# Patient Record
Sex: FEMALE | Race: WHITE
[De-identification: names, ages, dates, MRNs, and addresses within clinical notes are randomized per-mention and may not be internally consistent; named-entity substitution may affect disease eponyms.]

---

## 2017-01-01 ENCOUNTER — HOSPITAL ENCOUNTER (EMERGENCY)
Dept: HOSPITAL 53 - M ED | Age: 27
Discharge: HOME | End: 2017-01-01
Payer: COMMERCIAL

## 2017-01-01 DIAGNOSIS — F43.10: ICD-10-CM

## 2017-01-01 DIAGNOSIS — Z72.0: ICD-10-CM

## 2017-01-01 DIAGNOSIS — Z79.899: ICD-10-CM

## 2017-01-01 DIAGNOSIS — N21.1: Primary | ICD-10-CM

## 2017-01-01 DIAGNOSIS — E07.9: ICD-10-CM

## 2017-01-01 LAB
ALBUMIN SERPL BCG-MCNC: 4.5 GM/DL (ref 3.2–5.2)
ALBUMIN/GLOB SERPL: 1.29 {RATIO} (ref 1–1.93)
ALP SERPL-CCNC: 74 U/L (ref 45–117)
ALT SERPL W P-5'-P-CCNC: 20 U/L (ref 12–78)
ANION GAP SERPL CALC-SCNC: 7 MEQ/L (ref 8–16)
AST SERPL-CCNC: 9 U/L (ref 15–37)
BASOPHILS # BLD AUTO: 0 K/MM3 (ref 0–0.2)
BASOPHILS NFR BLD AUTO: 0.4 % (ref 0–1)
BILIRUB CONJ SERPL-MCNC: 0.1 MG/DL (ref 0–0.2)
BILIRUB SERPL-MCNC: 0.5 MG/DL (ref 0.2–1)
BUN SERPL-MCNC: 19 MG/DL (ref 7–18)
CALCIUM SERPL-MCNC: 9 MG/DL (ref 8.5–10.1)
CAOX CRY URNS QL MICRO: (no result)
CHLORIDE SERPL-SCNC: 106 MEQ/L (ref 98–107)
CO2 SERPL-SCNC: 29 MEQ/L (ref 21–32)
CREAT SERPL-MCNC: 0.91 MG/DL (ref 0.55–1.02)
EOSINOPHIL # BLD AUTO: 0.3 K/MM3 (ref 0–0.5)
EOSINOPHIL NFR BLD AUTO: 2.7 % (ref 0–3)
ERYTHROCYTE [DISTWIDTH] IN BLOOD BY AUTOMATED COUNT: 12.9 % (ref 11.5–14.5)
GFR SERPL CREATININE-BSD FRML MDRD: > 60 ML/MIN/{1.73_M2} (ref 60–?)
GLUCOSE SERPL-MCNC: 93 MG/DL (ref 70–105)
LARGE UNSTAINED CELL #: 0.1 K/MM3 (ref 0–0.4)
LARGE UNSTAINED CELL %: 0.9 % (ref 0–4)
LYMPHOCYTES # BLD AUTO: 1 K/MM3 (ref 1.5–6.5)
LYMPHOCYTES NFR BLD AUTO: 9.7 % (ref 24–44)
MCH RBC QN AUTO: 29 PG (ref 27–33)
MCHC RBC AUTO-ENTMCNC: 33.1 G/DL (ref 32–36.5)
MCV RBC AUTO: 87.6 FL (ref 80–96)
MONOCYTES # BLD AUTO: 0.4 K/MM3 (ref 0–0.8)
MONOCYTES NFR BLD AUTO: 3.7 % (ref 0–5)
NEUTROPHILS # BLD AUTO: 8.5 K/MM3 (ref 1.8–7.7)
NEUTROPHILS NFR BLD AUTO: 82.6 % (ref 36–66)
PLATELET # BLD AUTO: 244 K/MM3 (ref 150–450)
POTASSIUM SERPL-SCNC: 3.7 MEQ/L (ref 3.5–5.1)
PROT SERPL-MCNC: 8 GM/DL (ref 6.4–8.2)
SODIUM SERPL-SCNC: 142 MEQ/L (ref 136–145)
WBC # BLD AUTO: 10.3 K/MM3 (ref 4–10)

## 2017-01-01 NOTE — EDDOCDS
Physician Documentation                                                                           

[facility]                                                                         

Name: Idania Ponce                                                                                

Age: 26 yrs                                                                                       

Sex: Female                                                                                       

: 1990                                                                                   

MRN: [id number]                                                                                     

Arrival Date: 2017                                                                          

Time: 10:05                                                                                       

Account#: [id number]                                                                              

Bed I2 / M2                                                                                       

Private MD:                                                                                       

Disposition:                                                                                      

17 12:49 Discharged to Home/Self Care. Impression: Unspecified renal colic.                 

- Condition is Stable.                                                                            

- Discharge Instructions: Ureteral Colic.                                                         

- Prescriptions for Ultram 50 mg Oral Tablet - take 1 tablet by ORAL route every 6                

hours As needed MDD: 4 tabs; 12 tablet.                                                         

- Medication Reconciliation, Local Pharmacy Hours form.                                           

- Follow up: Emergency Department; When: As needed; Reason: Worsening of conditions.              

Follow up: Private Physician; When: Call to arrange an appointment; Reason:                     

Wound/Symptom Recheck, Recheck today's complaints, Continuance of care.                         

- Problem is an ongoing problem.                                                                  

- Symptoms are resolved.                                                                          

                                                                                                  

                                                                                                  

                                                                                                  

Historical:                                                                                       

- Allergies: no known allergies;                                                                  

- Home Meds:                                                                                      

1. Effexor XR 225mg Oral 1 cap once daily                                                       

     (Last dose: 2016 07:00)                                                                

2. levothyroxine 88 mcg Oral cap 1 cap once daily                                               

     (Last dose: 2017 07:00)                                                                

3. Motrin 200mg Oral tab 4 tab as needed                                                        

     (Last dose: 2017 08:00)                                                                

- PMHx: PTSD; Thyroid problem;                                                                    

- PSHx: chest tube after birth; Right breast reconstruction;                                      

- Social history: Smoking status: Patient uses tobacco products, light tobacco smoker.            

No barriers to communication noted, The patient speaks fluent English.                          

- Family history: Not pertinent.                                                                  

- : The pt / caregiver states he / she is not on anticoagulants. Home medication list             

is obtained from the patient.                                                                   

- Exposure Risk Screening:: None identified.                                                      

                                                                                                  

                                                                                                  

OB/GYN:                                                                                           

                                                                                             

10:16 LMP N/A - Irregular menses                                                              po  

                                                                                                  

Vital Signs:                                                                                      

10:07  / 98; Pulse 88; Resp 18; Temp 98.1; Pulse Ox 100% ; Weight 64.86 kg / 142.99     sar1

      lbs; Height 5 ft. 4 in. (162.56 cm); Pain 9/10;                                             

11:42 Pain 4/10;                                                                              jc4 

12:49  / 78; Pulse 87; Resp 20; Temp 98.2(O); Pulse Ox 100% ; Pain 0/10;                jml1

10:07 Body Mass Index 24.55 (64.86 kg, 162.56 cm)                                             sar1

                                                                                                  

MDM:                                                                                              

10:53 NS 0.9% 1000 ml IV at bolus once ordered.                                               cc10

10:53 Ondansetron 4 mg IVP once ordered.                                                      cc10

10:53 ketorolac 30 mg IVP once ordered.                                                       cc10

10:53 IV Saline Lock ordered.                                                                 cc10

10:53 Undress patient appropriately for examination ordered.                                  cc10

10:54 Basic Metabolic Profile Ordered.                                                        EDMS

10:54 CBC with Diff Ordered.                                                                  EDMS

10:54 Lipase Ordered.                                                                         EDMS

10:54 Liver Profile Ordered.                                                                  EDMS

10:54 Urinalysis Ordered.                                                                     EDMS

10:54 Urine Culture Ordered.                                                                  EDMS

10:54 UCG by Nursing ordered.                                                                 cc10

10:54 NOTHING BY MOUTH+DIET ordered.                                                          EDMS

11:07 [facility] Payment Agreement was scanned into [employer] and attached to record.               jp5 

11:08 Financial registration complete.                                                        jp5 

11:54 Basic Metabolic Profile Reviewed.                                                       cc10

11:54 CBC with Diff Reviewed.                                                                 cc10

11:54 Liver Profile Reviewed.                                                                 cc10

11:54 Urinalysis Reviewed.                                                                    cc10

11:54 Lipase Reviewed.                                                                        cc10

11:58 morphine 4 mg IVP once ordered.                                                         cc10

11:59 CT ABD & PELVIS: No Contrast Ordered.                                                   EDMS

                                                                                                  

Point of Care Testing:                                                                            

      Urine Pregnancy:                                                                            

11:42 hCG Reading: Negative; Control Reading: Positive;                                       jc4 

      Ranges:                                                                                     

                                                                                                  

Administered Medications:                                                                         

11:04 Drug: NS 0.9% 1000 ml [sodium chloride 0.9 % intravenous solution] Route: IV; Rate:     hs1 

      bolus; Site: right antecubital;                                                             

12:56 Follow up: IV Status: Completed infusion; IV Intake: 1000ml                             hs1 

11:04 Drug: Ondansetron 4 mg [ondansetron HCl 2 mg/mL intravenous solution (2 mL)] Route:     hs1 

      IVP; Site: right antecubital;                                                               

11:05 Drug: ketorolac 30 mg [ketorolac 30 mg/mL (1 mL) injection solution (1 mL)] Route: IVP; hs1 

      Site: right antecubital;                                                                    

11:42 Follow up: Pain 4/10 Adult                                                              4 

12:06 Drug: morphine 4 mg [morphine 4 mg/mL intravenous cartridge (1 mL)] Route: IVP; Site:   [facility] 

      right antecubital;                                                                          

12:06 Follow up: Response: Confirmed pt not driving.                                          jc4 

                                                                                                  

                                                                                                  

Signatures:                                                                                       

Dispatcher [employer]                           Yovani Vega,RN                          RN   Renetta Hager RN                    RN   hs1                                                  

Melanie Harden RN                    RN   jc4                                                  

Tuan Matias, PAAlbinoC                    PA-C cc10                                                 

Montserrat Quezada                              jp5                                                  

                                                                                                  

The chart was reviewed and I authenticate all verbal orders and agree with the evaluation and 
treatment provided.Attachments:

11:07 [facility] Payment Agreement                                                                jp5 

                                                                                                  

**************************************************************************************************

MTDD

## 2017-01-01 NOTE — EDDOCDS
Nurse's Notes                                                                                     

[facility]                                                                         

Name: Idania Ponce                                                                                

Age: 26 yrs                                                                                       

Sex: Female                                                                                       

: 1990                                                                                   

MRN: [id number]                                                                                     

Arrival Date: 2017                                                                          

Time: 10:05                                                                                       

Account#: [id number]                                                                              

Bed I2 / M2                                                                                       

Private MD:                                                                                       

Diagnosis: Unspecified renal colic                                                                

                                                                                                  

Presentation:                                                                                     

                                                                                             

10:11 Presenting complaint: Patient states: Friday had pain in RLQ with N/V that resolved.    po  

      Awoke with right flank pain this am. Acute neurological deficits are not present.           

      Mechanism of Injury: No Mechanism of Injury. Adult Sepsis Screening: The patient does       

      not have new or worsening altered mentation. Patient's respiratory rate is less than        

      22. Systolic blood pressure is greater than 100. Patient has a qSOFA score of 0-            

      Negative Sepsis Screen. Suicide/Homicide risk assessment- the patient denies having any     

      suicidal and/or homicidal ideations and does not present with any other emotional,          

      behavioral or mental health complaints.  Status: Patient is not a service           

      member or  dependent. Transition of care: patient was not received from another     

      setting of care.                                                                            

10:11 Acuity: RADHA Level 3                                                                     po  

10:11 Method Of Arrival: Walkin/Carried/Asstd                                                 po  

                                                                                                  

Triage Assessment:                                                                                

10:16 General: Appears in no apparent distress, Behavior is appropriate for age, cooperative. po  

      Pain: Location: right flank Pain currently is 9 out of 10 on a pain scale. Quality of       

      pain is described as aching, dull, Is intermittent. HIV screening NA for this visit         

      Offered previously. Neurological: Level of Consciousness is awake, alert, Oriented to       

      person, place, time. GI: Reports nausea. : Denies burning with urination, urinary         

      frequency, urgency. Musculoskeletal: Denies weakness, numbness.                             

                                                                                                  

OB/GYN:                                                                                           

10:16 LMP N/A - Irregular menses                                                              po  

                                                                                                  

Historical:                                                                                       

- Allergies: no known allergies;                                                                  

- Home Meds:                                                                                      

1. Effexor XR 225mg Oral 1 cap once daily                                                       

     (Last dose: 2016 07:00)                                                                

2. levothyroxine 88 mcg Oral cap 1 cap once daily                                               

     (Last dose: 2017 07:00)                                                                

3. Motrin 200mg Oral tab 4 tab as needed                                                        

     (Last dose: 2017 08:00)                                                                

- PMHx: PTSD; Thyroid problem;                                                                    

- PSHx: chest tube after birth; Right breast reconstruction;                                      

- Social history: Smoking status: Patient uses tobacco products, light tobacco smoker.            

No barriers to communication noted, The patient speaks fluent English.                          

- Family history: Not pertinent.                                                                  

- : The pt / caregiver states he / she is not on anticoagulants. Home medication list             

is obtained from the patient.                                                                   

- Exposure Risk Screening:: None identified.                                                      

                                                                                                  

                                                                                                  

Screenin:09 Screening information is obtained from the patient. Fall risk: No risks identified.     jc4 

      Assistance ADL's: requires no assistance with activities of daily living. Abuse/DV          

      Screen: The patient / caregiver reports he/she is: not in a situation that causes fear,     

      pain or injury. Nutritional screening: No deficits noted. Advance Directives:               

      Currently, there is no health care proxy. There is no active DNR order. There is no         

      living will. There is no Power of . home support is adequate.                       

                                                                                                  

Assessment:                                                                                       

11:08 General: Appears in no apparent distress, Behavior is cooperative, pleasant. Pain: Pain jc4 

      currently is 7 out of 10 on a pain scale. Neurological: Level of Consciousness is           

      awake, alert, Oriented to person, place, time. Respiratory: Airway is patent                

      Respiratory effort is even, unlabored, Respiratory pattern is regular, symmetrical. :     

      Reports pain in right flank(s). Derm: Skin is pink, warm & dry.                           

11:44 General: Pt resting on stretcher. No distress noted at this time. Pt reports that pain  jc4 

      is currently "4/10" at this time. Color pink, skin warm and dry. Respirations easy and      

      full. Significant other at bedside. Call bell in reach.                                     

12:54 General: Appears in no apparent distress, comfortable, Behavior is appropriate for age, hs1 

      cooperative. Pain: Denies pain. Cardiovascular: No deficits noted. Respiratory: Airway      

      is patent Respiratory effort is even, unlabored, Respiratory pattern is regular,            

      symmetrical. GI: Denies nausea. : Denies. Derm: Skin is pink, warm & dry. normal.       

                                                                                                  

Vital Signs:                                                                                      

10:07  / 98; Pulse 88; Resp 18; Temp 98.1; Pulse Ox 100% ; Weight 64.86 kg; Height 5    sar1

      ft. 4 in. (162.56 cm); Pain 9/10;                                                           

11:42 Pain 4/10;                                                                              jc4 

12:49  / 78; Pulse 87; Resp 20; Temp 98.2(O); Pulse Ox 100% ; Pain 0/10;                jml1

10:07 Body Mass Index 24.55 (64.86 kg, 162.56 cm)                                             [facility]

                                                                                                  

Vitals:                                                                                           

10:07 Log In Time: 2017 at 10:07.                                                 [facility]

                                                                                                  

ED Course:                                                                                        

10:06 Patient visited by Kaya Min, Unit Clerk.                                      sar1

10:06 Patient moved to Waiting                                                                sar1

10:08 Patient moved to Pre RCE                                                                sar1

10:13 Triage Initiated                                                                        po  

10:16 Arm band placed on right wrist. Patient placed in waiting room. Family accompanied      po  

      patient.                                                                                    

10:18 Patient visited by Yovani Shah RN.                                                      po  

10:32 Patient moved to Triage 3                                                               mlb1

10:42 Tuan Matias PA-C is PHCP.                                                           cc10

10:42 Yue Rhoades MD is Attending Physician.                                      cc10

10:42 Patient visited by Tuan Matias PA-C.                                                cc10

10:42 Patient visited by Tuan Matias PA-C.                                                cc10

10:54 Melanie Harden, SHANE is Primary Nurse.                                                  rn1 

10:54 Patient moved to I2 / M2                                                                rn1 

11:01 Inserted saline lock: 20 gauge in right antecubital area The patient tolerated the      jc4 

      procedure well.                                                                             

11:04 Basic Metabolic Profile Sent.                                                           hs1 

11:04 CBC with Diff Sent.                                                                     hs1 

11:04 Lipase Sent.                                                                            hs1 

11:04 Liver Profile Sent.                                                                     hs1 

11:07 [facility] Payment Agreement was scanned into [employer] and attached to record.               jp5 

11:08 The patient / caregiver is instructed regarding the plan of care and ED course.         jc4 

11:13 Patient visited by Renetta Montero RN.                                                hs1 

11:42 Urinalysis Sent.                                                                        jc4 

11:42 Urine Culture Sent.                                                                     jc4 

11:44 Patient visited by Melanie Harden RN.                                                jc4 

12:43 CT ABD & PELVIS: No Contrast Returned.                                                  EDMS

12:45 Patient visited by Renetta Montero RN.                                                hs1 

12:49 Patient visited by Michael Wagner.                                                       jml1

12:55 Discontinued IV lock intact, bleeding controlled, pressure dressing applied, No         hs1 

      redness/swelling at site. No procedures done that require assistance.                       

                                                                                                  

Administered Medications:                                                                         

11:04 Drug: NS 0.9% 1000 ml [sodium chloride 0.9 % intravenous solution] Route: IV; Rate:     hs1 

      bolus; Site: right antecubital;                                                             

12:56 Follow up: IV Status: Completed infusion; IV Intake: 1000ml                             hs1 

11:04 Drug: Ondansetron 4 mg [ondansetron HCl 2 mg/mL intravenous solution (2 mL)] Route:     hs1 

      IVP; Site: right antecubital;                                                               

11:05 Drug: ketorolac 30 mg [ketorolac 30 mg/mL (1 mL) injection solution (1 mL)] Route: IVP; hs1 

      Site: right antecubital;                                                                    

11:42 Follow up: Pain 4/10 Adult                                                              jc4 

12:06 Drug: morphine 4 mg [morphine 4 mg/mL intravenous cartridge (1 mL)] Route: IVP; Site:   jc4 

      right antecubital;                                                                          

12:06 Follow up: Response: Confirmed pt not driving.                                          jc4 

                                                                                                  

                                                                                                  

Point of Care Testing:                                                                            

      Urine Pregnancy:                                                                            

11:42 hCG Reading: Negative; Control Reading: Positive;                                       jc4 

      Ranges:                                                                                     

                                                                                                  

Intake:                                                                                           

12:56 IV: 1000.00ml; Total: 1000.00ml.                                                        hs1 

                                                                                                  

Order Results:                                                                                    

Lab Order: Basic Metabolic Profile; SPEC'M 17 11:02                                         

      Test: GLUCOSE, FASTING; Value: 93; Range: ; Units: MG/DL; Status: F                   

      Test: BLOOD UREA NITROGEN; Value: 19; Range: 7-18; Abnormal: Above high normal; Units:      

      MG/DL; Status: F                                                                            

      Test: CREATININE FOR GFR; Value: 0.91; Range: 0.55-1.02; Units: MG/DL; Status: F            

      Test: GLOMERULAR FILTRATION RATE; Value: > 60.0; Range: >60; Status: F                      

      Test: SODIUM LEVEL; Value: 142; Range: 136-145; Units: MEQ/L; Status: F                     

      Test: POTASSIUM SERUM; Value: 3.7; Range: 3.5-5.1; Units: MEQ/L; Status: F                  

      Test: CHLORIDE LEVEL; Value: 106; Range: ; Units: MEQ/L; Status: F                    

      Test: CARBON DIOXIDE LEVEL; Value: 29; Range: 21-32; Units: MEQ/L; Status: F                

      Test: ANION GAP; Value: 7; Range: 8-16; Abnormal: Below low normal; Units: MEQ/L;           

      Status: F                                                                                   

      Test: CALCIUM LEVEL; Value: 9.0; Range: 8.5-10.1; Units: MG/DL; Status: F                   

      Test Note: &nbsp;; Units are mL/min/1.73 m2 Chronic Kidney Disease Staging per NKF:       

      Stage I & II GFR >=60 Normal to Mildly Decreased Stage III GFR 30-59 Moderately           

      Decreased Stage IV GFR 15-29 Severely Decreased Stage V GFR <15 Very Little GFR Left        

      ESRD GFR <15 on RRT                                                                         

Lab Order: CBC with Diff; SPEC'M 17 11:02                                                   

      Test: WHITE BLOOD COUNT; Value: 10.3; Range: 4.0-10.0; Abnormal: Above high normal;         

      Units: K/mm3; Status: F                                                                     

      Test: RED BLOOD COUNT; Value: 4.60; Range: 4.00-5.40; Units: M/mm3; Status: F               

      Test: HEMOGLOBIN; Value: 13.3; Range: 12.0-16.0; Units: g/dl; Status: F                     

      Test: HEMATOCRIT; Value: 40.3; Range: 36.0-47.0; Units: %; Status: F                        

      Test: MEAN CORPUSCULAR VOLUME; Value: 87.6; Range: 80.0-96.0; Units: fl; Status: F          

      Test: MEAN CORPUSCULAR HEMOGLOBIN; Value: 29.0; Range: 27.0-33.0; Units: pg; Status: F      

      Test: MEAN CORPUSCULAR HGB CONC; Value: 33.1; Range: 32.0-36.5; Units: g/dl; Status: F      

      Test: RED CELL DISTRIBUTION WIDTH; Value: 12.9; Range: 11.5-14.5; Units: %; Status: F       

      Test: PLATELET COUNT, AUTOMATED; Value: 244; Range: 150-450; Units: k/mm3; Status: F        

      Test: NEUTROPHILS %; Value: 82.6; Range: 36.0-66.0; Abnormal: Above high normal; Units:     

      %; Status: F                                                                                

      Test: LYMPH %; Value: 9.7; Range: 24.0-44.0; Abnormal: Below low normal; Units: %;          

      Status: F                                                                                   

      Test: MONO %; Value: 3.7; Range: 0.0-5.0; Units: %; Status: F                               

      Test: EOS %; Value: 2.7; Range: 0.0-3.0; Units: %; Status: F                                

      Test: BASO %; Value: 0.4; Range: 0.0-1.0; Units: %; Status: F                               

      Test: LARGE UNSTAINED CELL %; Value: 0.9; Range: 0.0-4.0; Units: %; Status: F               

      Test: NEUTROPHILS #; Value: 8.5; Range: 1.8-7.7; Abnormal: Above high normal; Units:        

      K/mm3; Status: F                                                                            

      Test: LYMPH #; Value: 1.0; Range: 1.5-6.5; Abnormal: Below low normal; Units: K/mm3;        

      Status: F                                                                                   

      Test: MONO #; Value: 0.4; Range: 0.0-0.8; Units: K/mm3; Status: F                           

      Test: EOS #; Value: 0.3; Range: 0.0-0.50; Units: K/mm3; Status: F                           

      Test: BASO #; Value: 0.0; Range: 0.0-0.2; Units: K/mm3; Status: F                           

      Test: LARGE UNSTAINED CELL #; Value: 0.1; Range: 0.0-0.4; Units: K/mm3; Status: F           

Lab Order: Lipase; SPEC'M 17 11:02                                                          

      Test: LIPASE; Value: 107; Range: ; Units: U/L; Status: F                              

Lab Order: Liver Profile; SPEC'M 17 11:02                                                   

      Test: AST/SGOT; Value: 9; Range: 15-37; Abnormal: Below low normal; Units: U/L; Status:     

      F                                                                                           

      Test: ALT/SGPT; Value: 20; Range: 12-78; Units: U/L; Status: F                              

      Test: ALKALINE PHOSPHATASE; Value: 74; Range: ; Units: U/L; Status: F                 

      Test: BILIRUBIN,TOTAL; Value: 0.5; Range: 0.2-1.0; Units: MG/DL; Status: F                  

      Test: BILIRUBIN,DIRECT; Value: 0.1; Range: 0.0-0.2; Units: MG/DL; Status: F                 

      Test: TOTAL PROTEIN; Value: 8.0; Range: 6.4-8.2; Units: GM/DL; Status: F                    

      Test: ALBUMIN; Value: 4.5; Range: 3.2-5.2; Units: GM/DL; Status: F                          

      Test: ALBUMIN/GLOBULIN RATIO; Value: 1.29; Range: 1.00-1.93; Status: F                      

Lab Order: Urinalysis; SPEC'M 17 11:37                                                      

      Test: APPEARANCE, URINE; Value: HAZY; Range: CLEAR; Status: F                               

      Test: COLOR, URINE; Value: YELLOW; Range: YELLOW; Status: F                                 

      Test: PH,URINE; Value: 6.0; Range: 5.0-9.0; Units: UNITS; Status: F                         

      Test: SPECIFIC GRAVITY URINE AUTO; Value: 1.017; Range: 1.002-1.035; Status: F              

      Test: PROTEIN, URINE AUTO; Value: 2+; Range: NEGATIVE; Abnormal: Above high normal;         

      Units: mg/dL; Status: F                                                                     

      Test: GLUCOSE, URINE (UA) AUTO; Value: NEGATIVE; Range: NEGATIVE; Units: mg/dL; Status:     

      F                                                                                           

      Test: KETONE, URINE AUTO; Value: NEGATIVE; Range: NEGATIVE; Units: mg/dL; Status: F         

      Test: UROBILINOGEN, URINE AUTO; Value: 0.2; Range: 0.0-2.0; Units: mg/dL; Status: F         

      Test: BILIRUBIN, URINE AUTO; Value: NEGATIVE; Range: NEGATIVE; Status: F                    

      Test: NITRITE, URINE AUTO; Value: NEGATIVE; Range: NEGATIVE; Status: F                      

      Test: LEUKOCYTE ESTERASE, URINE AUTO; Value: TRACE; Range: NEGATIVE; Abnormal: Above        

      high normal; Status: F                                                                      

      Test: BLOOD, URINE BLOOD; Value: 3+; Range: NEGATIVE; Abnormal: Above high normal;          

      Status: F                                                                                   

      Test: WBC, URINE AUTO; Value: 6; Range: 0-3; Abnormal: Above high normal; Units: /HPF;      

      Status: F                                                                                   

      Test: RBC, URINE AUTO; Value: TNTC; Range: 0-3; Abnormal: Above high normal; Units:         

      /HPF; Status: F                                                                             

      Test: BACTERIA, URINE AUTO; Value: NEGATIVE; Range: NEGATIVE; Status: F                     

      Test: SQUAMOUS EPITHELIAL CELL UR AU; Value: 1; Range: 0-6; Units: /HPF; Status: F          

      Test: MUCUS, URINE; Value: MODERATE; Range: NEGATIVE; Status: F                             

      Test: HYALINE CAST, URINE AUTO; Value: 0; Range: 0-1; Units: /LPF; Status: F                

      Test: CALCIUM OXALATE CRYSTALS; Value: SMALL; Range: NONE; Status: F                        

                                                                                                  

Radiology Order: CT ABD & PELVIS: No Contrast                                                   

      Test: CT ABD & PELVIS: No Contrast                                                        

      REASON FOR EXAMINATION: Renal colic; Clinical: Right flank pain.; ; Findings:; Lung         

      bases clear. Visualized heart and pericardium normal.; ; Liver, spleen, pancreas,           

      gallbladder, bilateral adrenal glands and kidneys are; normal for noncontrast               

      evaluation. The enteric system is without obstruction or; acute inflammatory process.       

      Mildly prominent lymph nodes in the right lower; quadrant/pericecal region may reflect      

      mesenteric adenitis. Pelvis demonstrates; normal bladder and age-appropriate                

      uterus/adnexa. No pelvic fluid/ascites. No; free air. Abdominal aorta normal.               

      Surrounding musculoskeletal structures are; intact.; ; Impression:; 1. Cannot exclude       

      mesenteric adenitis.; 2. Otherwise unremarkable noncontrast CT of the abdomen and           

      pelvis. No evidence; for hydroureteronephrosis or intra renal/obstructing ureteral          

      calculi.; 3. No ascites.; ; ; Signed by; Archie Rivers MD 2017 12:30 P;              

Outcome:                                                                                          

12:49 Discharge ordered by Provider.                                                          cc10

12:55 Discharge Assessment: Patient awake, alert and oriented x 3. No cognitive and/or        hs1 

      functional deficits noted. Patient verbalized understanding of disposition                  

      instructions. patient administered narcotics - yes. Pt provided with safe discharge.        

      The following High Risk Discharge criteria are identified: None. Discharged to home         

      ambulatory. Condition: stable. Discharge instructions given to patient, Instructed on       

      discharge instructions, follow up and referral plans. medication usage, Demonstrated        

      understanding of instructions, medications, Pt was receptive of discharge instructions/     

      teaching. Prescriptions given X 1. CT Study completed. Property sent home with patient.     

12:56 Patient left the ED.                                                                    hs1 

                                                                                                  

Signatures:                                                                                       

Dispatcher [employer]                           EDMS                                                 

Yovani Shah,RN                          RN   Bon Robertson RN                   RN   mlb1                                                 

Renetta Montero RN                    RN   hs1                                                  

Melanie Harden RN                    RN   jc4                                                  

Michael Wagner                                jml1                                                 

Tuan Matias, PA-C                    PA-C cc10                                                 

Kaya Min, Unit Clerk          Unit sar1                                                 

Mike Kiran                               rn1                                                  

Montserrat Quezada                              jp5                                                  

                                                                                                  

**************************************************************************************************

LULA

## 2017-01-01 NOTE — REP
Clinical:  Right flank pain.

 

Findings:

Lung bases clear.  Visualized heart and pericardium normal.

 

Liver, spleen, pancreas, gallbladder, bilateral adrenal glands and kidneys are

normal for noncontrast evaluation.  The enteric system is without obstruction or

acute inflammatory process.  Mildly prominent lymph nodes in the right lower

quadrant/pericecal region may reflect mesenteric adenitis.  Pelvis demonstrates

normal bladder and age-appropriate uterus/adnexa.  No pelvic fluid/ascites.  No

free air.  Abdominal aorta normal.  Surrounding musculoskeletal structures are

intact.

 

Impression:

1.  Cannot exclude mesenteric adenitis.

2.  Otherwise unremarkable noncontrast CT of the abdomen and pelvis.  No evidence

for hydroureteronephrosis or intra renal/obstructing ureteral calculi.

3.  No ascites.

 

 

Signed by

Archie Rivers MD 01/01/2017 12:30 P

## 2017-01-03 NOTE — EDDOCDS
Physician Documentation                                                                           

[facility]                                                                         

Name: Idania Ponce                                                                                

Age: 26 yrs                                                                                       

Sex: Female                                                                                       

: 1990                                                                                   

MRN: [id number]                                                                                     

Arrival Date: 2017                                                                          

Time: 10:05                                                                                       

Account#: [id number]                                                                              

Bed I2 / M2                                                                                       

Private MD:                                                                                       

Disposition:                                                                                      

17 12:49 Discharged to Home/Self Care. Impression: Unspecified renal colic.                 

- Condition is Stable.                                                                            

- Discharge Instructions: Ureteral Colic.                                                         

- Prescriptions for Ultram 50 mg Oral Tablet - take 1 tablet by ORAL route every 6                

hours As needed MDD: 4 tabs; 12 tablet.                                                         

- Medication Reconciliation, Local Pharmacy Hours form.                                           

- Follow up: Emergency Department; When: As needed; Reason: Worsening of conditions.              

Follow up: Private Physician; When: Call to arrange an appointment; Reason:                     

Wound/Symptom Recheck, Recheck today's complaints, Continuance of care.                         

- Problem is an ongoing problem.                                                                  

- Symptoms are resolved.                                                                          

                                                                                                  

                                                                                                  

                                                                                                  

Historical:                                                                                       

- Allergies: no known allergies;                                                                  

- Home Meds:                                                                                      

1. Effexor XR 225mg Oral 1 cap once daily                                                       

     (Last dose: 2016 07:00)                                                                

2. levothyroxine 88 mcg Oral cap 1 cap once daily                                               

     (Last dose: 2017 07:00)                                                                

3. Motrin 200mg Oral tab 4 tab as needed                                                        

     (Last dose: 2017 08:00)                                                                

- PMHx: PTSD; Thyroid problem;                                                                    

- PSHx: chest tube after birth; Right breast reconstruction;                                      

- Social history: Smoking status: Patient uses tobacco products, light tobacco smoker.            

No barriers to communication noted, The patient speaks fluent English.                          

- Family history: Not pertinent.                                                                  

- : The pt / caregiver states he / she is not on anticoagulants. Home medication list             

is obtained from the patient.                                                                   

- Exposure Risk Screening:: None identified.                                                      

                                                                                                  

                                                                                                  

OB/GYN:                                                                                           

                                                                                             

10:16 LMP N/A - Irregular menses                                                              po  

                                                                                                  

Vital Signs:                                                                                      

10:07  / 98; Pulse 88; Resp 18; Temp 98.1; Pulse Ox 100% ; Weight 64.86 kg / 142.99     sar1

      lbs; Height 5 ft. 4 in. (162.56 cm); Pain 9/10;                                             

11:42 Pain 4/10;                                                                              jc4 

12:49  / 78; Pulse 87; Resp 20; Temp 98.2(O); Pulse Ox 100% ; Pain 0/10;                jml1

10:07 Body Mass Index 24.55 (64.86 kg, 162.56 cm)                                             sar1

                                                                                                  

MDM:                                                                                              

10:53 NS 0.9% 1000 ml IV at bolus once ordered.                                               cc10

10:53 Ondansetron 4 mg IVP once ordered.                                                      cc10

10:53 ketorolac 30 mg IVP once ordered.                                                       cc10

10:53 IV Saline Lock ordered.                                                                 cc10

10:53 Undress patient appropriately for examination ordered.                                  cc10

10:54 Basic Metabolic Profile Ordered.                                                        EDMS

10:54 CBC with Diff Ordered.                                                                  EDMS

10:54 Lipase Ordered.                                                                         EDMS

10:54 Liver Profile Ordered.                                                                  EDMS

10:54 Urinalysis Ordered.                                                                     EDMS

10:54 Urine Culture Ordered.                                                                  EDMS

10:54 UCG by Nursing ordered.                                                                 cc10

10:54 NOTHING BY MOUTH+DIET ordered.                                                          EDMS

11:07 [facility] Payment Agreement was scanned into [employer] and attached to record.               jp5 

11:08 Financial registration complete.                                                        jp5 

11:54 Basic Metabolic Profile Reviewed.                                                       cc10

11:54 CBC with Diff Reviewed.                                                                 cc10

11:54 Liver Profile Reviewed.                                                                 cc10

11:54 Urinalysis Reviewed.                                                                    cc10

11:54 Lipase Reviewed.                                                                        cc10

11:58 morphine 4 mg IVP once ordered.                                                         cc10

11:59 CT ABD & PELVIS: No Contrast Ordered.                                                   EDMS

17:23 T-Sheet-- Draft Copy was scanned into [employer] and attached to record.                   klr 

                                                                                                  

Point of Care Testing:                                                                            

      Urine Pregnancy:                                                                            

11:42 hCG Reading: Negative; Control Reading: Positive;                                       jc4 

      Ranges:                                                                                     

                                                                                                  

Administered Medications:                                                                         

11:04 Drug: NS 0.9% 1000 ml [sodium chloride 0.9 % intravenous solution] Route: IV; Rate:     hs1 

      bolus; Site: right antecubital;                                                             

12:56 Follow up: IV Status: Completed infusion; IV Intake: 1000ml                             hs1 

11:04 Drug: Ondansetron 4 mg [ondansetron HCl 2 mg/mL intravenous solution (2 mL)] Route:     hs1 

      IVP; Site: right antecubital;                                                               

11:05 Drug: ketorolac 30 mg [ketorolac 30 mg/mL (1 mL) injection solution (1 mL)] Route: IVP; hs1 

      Site: right antecubital;                                                                    

11:42 Follow up: Pain 4/10 Adult                                                              jc4 

12:06 Drug: morphine 4 mg [morphine 4 mg/mL intravenous cartridge (1 mL)] Route: IVP; Site:   [facility] 

      right antecubital;                                                                          

12:06 Follow up: Response: Confirmed pt not driving.                                          jc4 

                                                                                                  

                                                                                                  

Signatures:                                                                                       

Dispatcher [employer]                           EDYovani Willard,RN                          RN   po                                                   

Renetta Montero RN                    RN   hs1                                                  

Melanie Harden RN                    RN   jc4                                                  

Tuan Matias PA-C                    PAADALBETRO cc10                                                 

Montserrat Quezada jp5                                                  

Meaghan Mejia                                                  

                                                                                                  

The chart was reviewed and I authenticate all verbal orders and agree with the evaluation and 
treatment provided.Attachments:

11:07 [facility] Payment Agreement                                                                jp5 

17:23 T-Sheet-- Draft Copy                                                                    klr 

                                                                                                  

**************************************************************************************************



*** Chart Complete ***
MTDD

## 2017-01-03 NOTE — EDDOCDS
Physician Documentation                                                                           

[facility]                                                                         

Name: Idania Ponce                                                                                

Age: 26 yrs                                                                                       

Sex: Female                                                                                       

: 1990                                                                                   

MRN: [id number]                                                                                     

Arrival Date: 2017                                                                          

Time: 10:05                                                                                       

Account#: [id number]                                                                              

Bed I2 / M2                                                                                       

Private MD:                                                                                       

Disposition:                                                                                      

17 12:49 Discharged to Home/Self Care. Impression: Unspecified renal colic.                 

- Condition is Stable.                                                                            

- Discharge Instructions: Ureteral Colic.                                                         

- Prescriptions for Ultram 50 mg Oral Tablet - take 1 tablet by ORAL route every 6                

hours As needed MDD: 4 tabs; 12 tablet.                                                         

- Medication Reconciliation, Local Pharmacy Hours form.                                           

- Follow up: Emergency Department; When: As needed; Reason: Worsening of conditions.              

Follow up: Private Physician; When: Call to arrange an appointment; Reason:                     

Wound/Symptom Recheck, Recheck today's complaints, Continuance of care.                         

- Problem is an ongoing problem.                                                                  

- Symptoms are resolved.                                                                          

                                                                                                  

                                                                                                  

                                                                                                  

Historical:                                                                                       

- Allergies: no known allergies;                                                                  

- Home Meds:                                                                                      

1. Effexor XR 225mg Oral 1 cap once daily                                                       

     (Last dose: 2016 07:00)                                                                

2. levothyroxine 88 mcg Oral cap 1 cap once daily                                               

     (Last dose: 2017 07:00)                                                                

3. Motrin 200mg Oral tab 4 tab as needed                                                        

     (Last dose: 2017 08:00)                                                                

- PMHx: PTSD; Thyroid problem;                                                                    

- PSHx: chest tube after birth; Right breast reconstruction;                                      

- Social history: Smoking status: Patient uses tobacco products, light tobacco smoker.            

No barriers to communication noted, The patient speaks fluent English.                          

- Family history: Not pertinent.                                                                  

- : The pt / caregiver states he / she is not on anticoagulants. Home medication list             

is obtained from the patient.                                                                   

- Exposure Risk Screening:: None identified.                                                      

                                                                                                  

                                                                                                  

OB/GYN:                                                                                           

                                                                                             

10:16 LMP N/A - Irregular menses                                                              po  

                                                                                                  

Vital Signs:                                                                                      

10:07  / 98; Pulse 88; Resp 18; Temp 98.1; Pulse Ox 100% ; Weight 64.86 kg / 142.99     sar1

      lbs; Height 5 ft. 4 in. (162.56 cm); Pain 9/10;                                             

11:42 Pain 4/10;                                                                              jc4 

12:49  / 78; Pulse 87; Resp 20; Temp 98.2(O); Pulse Ox 100% ; Pain 0/10;                jml1

10:07 Body Mass Index 24.55 (64.86 kg, 162.56 cm)                                             sar1

                                                                                                  

MDM:                                                                                              

10:53 NS 0.9% 1000 ml IV at bolus once ordered.                                               cc10

10:53 Ondansetron 4 mg IVP once ordered.                                                      cc10

10:53 ketorolac 30 mg IVP once ordered.                                                       cc10

10:53 IV Saline Lock ordered.                                                                 cc10

10:53 Undress patient appropriately for examination ordered.                                  cc10

10:54 Basic Metabolic Profile Ordered.                                                        EDMS

10:54 CBC with Diff Ordered.                                                                  EDMS

10:54 Lipase Ordered.                                                                         EDMS

10:54 Liver Profile Ordered.                                                                  EDMS

10:54 Urinalysis Ordered.                                                                     EDMS

10:54 Urine Culture Ordered.                                                                  EDMS

10:54 UCG by Nursing ordered.                                                                 cc10

10:54 NOTHING BY MOUTH+DIET ordered.                                                          EDMS

11:07 [facility] Payment Agreement was scanned into [employer] and attached to record.               jp5 

11:08 Financial registration complete.                                                        jp5 

11:54 Basic Metabolic Profile Reviewed.                                                       cc10

11:54 CBC with Diff Reviewed.                                                                 cc10

11:54 Liver Profile Reviewed.                                                                 cc10

11:54 Urinalysis Reviewed.                                                                    cc10

11:54 Lipase Reviewed.                                                                        cc10

11:58 morphine 4 mg IVP once ordered.                                                         cc10

11:59 CT ABD & PELVIS: No Contrast Ordered.                                                   EDMS

17:23 T-Sheet-- Draft Copy was scanned into [employer] and attached to record.                   klr 

                                                                                                  

Point of Care Testing:                                                                            

      Urine Pregnancy:                                                                            

11:42 hCG Reading: Negative; Control Reading: Positive;                                       jc4 

      Ranges:                                                                                     

                                                                                                  

Administered Medications:                                                                         

11:04 Drug: NS 0.9% 1000 ml [sodium chloride 0.9 % intravenous solution] Route: IV; Rate:     hs1 

      bolus; Site: right antecubital;                                                             

12:56 Follow up: IV Status: Completed infusion; IV Intake: 1000ml                             hs1 

11:04 Drug: Ondansetron 4 mg [ondansetron HCl 2 mg/mL intravenous solution (2 mL)] Route:     hs1 

      IVP; Site: right antecubital;                                                               

11:05 Drug: ketorolac 30 mg [ketorolac 30 mg/mL (1 mL) injection solution (1 mL)] Route: IVP; hs1 

      Site: right antecubital;                                                                    

11:42 Follow up: Pain 4/10 Adult                                                              jc4 

12:06 Drug: morphine 4 mg [morphine 4 mg/mL intravenous cartridge (1 mL)] Route: IVP; Site:   [facility] 

      right antecubital;                                                                          

12:06 Follow up: Response: Confirmed pt not driving.                                          jc4 

                                                                                                  

                                                                                                  

Signatures:                                                                                       

Dispatcher [employer]                           EDYovani Willard,RN                          RN   po                                                   

Renetta Montero RN                    RN   hs1                                                  

Melanie Harden RN                    RN   jc4                                                  

Tuan Matias PA-C                    PAADALBERTO cc10                                                 

Montserrat Quezada jp5                                                  

Meaghan Mejia                                                  

                                                                                                  

The chart was reviewed and I authenticate all verbal orders and agree with the evaluation and 
treatment provided.Attachments:

11:07 [facility] Payment Agreement                                                                jp5 

17:23 T-Sheet-- Draft Copy                                                                    klr 

                                                                                                  

**************************************************************************************************



*** Chart Complete ***
MTDD

## 2017-01-03 NOTE — EDDOCDS
Nurse's Notes                                                                                     

[facility]                                                                         

Name: Idania Ponce                                                                                

Age: 26 yrs                                                                                       

Sex: Female                                                                                       

: 1990                                                                                   

MRN: [id number]                                                                                     

Arrival Date: 2017                                                                          

Time: 10:05                                                                                       

Account#: [id number]                                                                              

Bed I2 / M2                                                                                       

Private MD:                                                                                       

Diagnosis: Unspecified renal colic                                                                

                                                                                                  

Presentation:                                                                                     

                                                                                             

10:11 Presenting complaint: Patient states: Friday had pain in RLQ with N/V that resolved.    po  

      Awoke with right flank pain this am. Acute neurological deficits are not present.           

      Mechanism of Injury: No Mechanism of Injury. Adult Sepsis Screening: The patient does       

      not have new or worsening altered mentation. Patient's respiratory rate is less than        

      22. Systolic blood pressure is greater than 100. Patient has a qSOFA score of 0-            

      Negative Sepsis Screen. Suicide/Homicide risk assessment- the patient denies having any     

      suicidal and/or homicidal ideations and does not present with any other emotional,          

      behavioral or mental health complaints.  Status: Patient is not a service           

      member or  dependent. Transition of care: patient was not received from another     

      setting of care.                                                                            

10:11 Acuity: RADHA Level 3                                                                     po  

10:11 Method Of Arrival: Walkin/Carried/Asstd                                                 po  

                                                                                                  

Triage Assessment:                                                                                

10:16 General: Appears in no apparent distress, Behavior is appropriate for age, cooperative. po  

      Pain: Location: right flank Pain currently is 9 out of 10 on a pain scale. Quality of       

      pain is described as aching, dull, Is intermittent. HIV screening NA for this visit         

      Offered previously. Neurological: Level of Consciousness is awake, alert, Oriented to       

      person, place, time. GI: Reports nausea. : Denies burning with urination, urinary         

      frequency, urgency. Musculoskeletal: Denies weakness, numbness.                             

                                                                                                  

OB/GYN:                                                                                           

10:16 LMP N/A - Irregular menses                                                              po  

                                                                                                  

Historical:                                                                                       

- Allergies: no known allergies;                                                                  

- Home Meds:                                                                                      

1. Effexor XR 225mg Oral 1 cap once daily                                                       

     (Last dose: 2016 07:00)                                                                

2. levothyroxine 88 mcg Oral cap 1 cap once daily                                               

     (Last dose: 2017 07:00)                                                                

3. Motrin 200mg Oral tab 4 tab as needed                                                        

     (Last dose: 2017 08:00)                                                                

- PMHx: PTSD; Thyroid problem;                                                                    

- PSHx: chest tube after birth; Right breast reconstruction;                                      

- Social history: Smoking status: Patient uses tobacco products, light tobacco smoker.            

No barriers to communication noted, The patient speaks fluent English.                          

- Family history: Not pertinent.                                                                  

- : The pt / caregiver states he / she is not on anticoagulants. Home medication list             

is obtained from the patient.                                                                   

- Exposure Risk Screening:: None identified.                                                      

                                                                                                  

                                                                                                  

Screenin:09 Screening information is obtained from the patient. Fall risk: No risks identified.     jc4 

      Assistance ADL's: requires no assistance with activities of daily living. Abuse/DV          

      Screen: The patient / caregiver reports he/she is: not in a situation that causes fear,     

      pain or injury. Nutritional screening: No deficits noted. Advance Directives:               

      Currently, there is no health care proxy. There is no active DNR order. There is no         

      living will. There is no Power of . home support is adequate.                       

                                                                                                  

Assessment:                                                                                       

11:08 General: Appears in no apparent distress, Behavior is cooperative, pleasant. Pain: Pain jc4 

      currently is 7 out of 10 on a pain scale. Neurological: Level of Consciousness is           

      awake, alert, Oriented to person, place, time. Respiratory: Airway is patent                

      Respiratory effort is even, unlabored, Respiratory pattern is regular, symmetrical. :     

      Reports pain in right flank(s). Derm: Skin is pink, warm & dry.                           

11:44 General: Pt resting on stretcher. No distress noted at this time. Pt reports that pain  jc4 

      is currently "4/10" at this time. Color pink, skin warm and dry. Respirations easy and      

      full. Significant other at bedside. Call bell in reach.                                     

12:54 General: Appears in no apparent distress, comfortable, Behavior is appropriate for age, hs1 

      cooperative. Pain: Denies pain. Cardiovascular: No deficits noted. Respiratory: Airway      

      is patent Respiratory effort is even, unlabored, Respiratory pattern is regular,            

      symmetrical. GI: Denies nausea. : Denies. Derm: Skin is pink, warm & dry. normal.       

                                                                                                  

Vital Signs:                                                                                      

10:07  / 98; Pulse 88; Resp 18; Temp 98.1; Pulse Ox 100% ; Weight 64.86 kg; Height 5    sar1

      ft. 4 in. (162.56 cm); Pain 9/10;                                                           

11:42 Pain 4/10;                                                                              jc4 

12:49  / 78; Pulse 87; Resp 20; Temp 98.2(O); Pulse Ox 100% ; Pain 0/10;                jml1

10:07 Body Mass Index 24.55 (64.86 kg, 162.56 cm)                                             [facility]

                                                                                                  

Vitals:                                                                                           

10:07 Log In Time: 2017 at 10:07.                                                 [facility]

                                                                                                  

ED Course:                                                                                        

10:06 Patient visited by Kaya Min, Unit Clerk.                                      sar1

10:06 Patient moved to Waiting                                                                sar1

10:08 Patient moved to Pre RCE                                                                sar1

10:13 Triage Initiated                                                                        po  

10:16 Arm band placed on right wrist. Patient placed in waiting room. Family accompanied      po  

      patient.                                                                                    

10:18 Patient visited by Yovani Shah RN.                                                      po  

10:32 Patient moved to Triage 3                                                               mlb1

10:42 Tuan Matias PA-C is PHCP.                                                           cc10

10:42 Yue Rhoades MD is Attending Physician.                                      cc10

10:42 Patient visited by Tuan Matias PA-C.                                                cc10

10:42 Patient visited by Tuan Matias PA-C.                                                cc10

10:54 Melanie Harden, SHANE is Primary Nurse.                                                  rn1 

10:54 Patient moved to I2 / M2                                                                rn1 

11:01 Inserted saline lock: 20 gauge in right antecubital area The patient tolerated the      jc4 

      procedure well.                                                                             

11:04 Basic Metabolic Profile Sent.                                                           hs1 

11:04 CBC with Diff Sent.                                                                     hs1 

11:04 Lipase Sent.                                                                            hs1 

11:04 Liver Profile Sent.                                                                     hs1 

11:07 [facility] Payment Agreement was scanned into [employer] and attached to record.               jp5 

11:08 The patient / caregiver is instructed regarding the plan of care and ED course.         jc4 

11:13 Patient visited by Renetta Montero RN.                                                hs1 

11:42 Urinalysis Sent.                                                                        jc4 

11:42 Urine Culture Sent.                                                                     jc4 

11:44 Patient visited by Melanie Harden RN.                                                jc4 

12:43 CT ABD & PELVIS: No Contrast Returned.                                                  EDMS

12:45 Patient visited by Renetta Montero RN.                                                hs1 

12:49 Patient visited by Michael Wagner.                                                       jml1

12:55 Discontinued IV lock intact, bleeding controlled, pressure dressing applied, No         hs1 

      redness/swelling at site. No procedures done that require assistance.                       

17:23 T-Sheet-- Draft Copy was scanned into [employer] and attached to record.                   klr 

                                                                                                  

Administered Medications:                                                                         

11:04 Drug: NS 0.9% 1000 ml [sodium chloride 0.9 % intravenous solution] Route: IV; Rate:     hs1 

      bolus; Site: right antecubital;                                                             

12:56 Follow up: IV Status: Completed infusion; IV Intake: 1000ml                             hs1 

11:04 Drug: Ondansetron 4 mg [ondansetron HCl 2 mg/mL intravenous solution (2 mL)] Route:     hs1 

      IVP; Site: right antecubital;                                                               

11:05 Drug: ketorolac 30 mg [ketorolac 30 mg/mL (1 mL) injection solution (1 mL)] Route: IVP; hs1 

      Site: right antecubital;                                                                    

11:42 Follow up: Pain 4/10 Adult                                                              jc4 

12:06 Drug: morphine 4 mg [morphine 4 mg/mL intravenous cartridge (1 mL)] Route: IVP; Site:   [facility] 

      right antecubital;                                                                          

12:06 Follow up: Response: Confirmed pt not driving.                                          jc4 

                                                                                                  

                                                                                                  

Point of Care Testing:                                                                            

      Urine Pregnancy:                                                                            

11:42 hCG Reading: Negative; Control Reading: Positive;                                       jc4 

      Ranges:                                                                                     

                                                                                                  

Intake:                                                                                           

12:56 IV: 1000.00ml; Total: 1000.00ml.                                                        hs1 

                                                                                                  

Order Results:                                                                                    

Lab Order: Basic Metabolic Profile; SPEC'M 17 11:02                                         

      Test: GLUCOSE, FASTING; Value: 93; Range: ; Units: MG/DL; Status: F                   

      Test: BLOOD UREA NITROGEN; Value: 19; Range: 7-18; Abnormal: Above high normal; Units:      

      MG/DL; Status: F                                                                            

      Test: CREATININE FOR GFR; Value: 0.91; Range: 0.55-1.02; Units: MG/DL; Status: F            

      Test: GLOMERULAR FILTRATION RATE; Value: > 60.0; Range: >60; Status: F                      

      Test: SODIUM LEVEL; Value: 142; Range: 136-145; Units: MEQ/L; Status: F                     

      Test: POTASSIUM SERUM; Value: 3.7; Range: 3.5-5.1; Units: MEQ/L; Status: F                  

      Test: CHLORIDE LEVEL; Value: 106; Range: ; Units: MEQ/L; Status: F                    

      Test: CARBON DIOXIDE LEVEL; Value: 29; Range: 21-32; Units: MEQ/L; Status: F                

      Test: ANION GAP; Value: 7; Range: 8-16; Abnormal: Below low normal; Units: MEQ/L;           

      Status: F                                                                                   

      Test: CALCIUM LEVEL; Value: 9.0; Range: 8.5-10.1; Units: MG/DL; Status: F                   

      Test Note: &nbsp;; Units are mL/min/1.73 m2 Chronic Kidney Disease Staging per NKF:       

      Stage I & II GFR >=60 Normal to Mildly Decreased Stage III GFR 30-59 Moderately           

      Decreased Stage IV GFR 15-29 Severely Decreased Stage V GFR <15 Very Little GFR Left        

      ESRD GFR <15 on RRT                                                                         

Lab Order: CBC with Diff; SPEC'M 17 11:02                                                   

      Test: WHITE BLOOD COUNT; Value: 10.3; Range: 4.0-10.0; Abnormal: Above high normal;         

      Units: K/mm3; Status: F                                                                     

      Test: RED BLOOD COUNT; Value: 4.60; Range: 4.00-5.40; Units: M/mm3; Status: F               

      Test: HEMOGLOBIN; Value: 13.3; Range: 12.0-16.0; Units: g/dl; Status: F                     

      Test: HEMATOCRIT; Value: 40.3; Range: 36.0-47.0; Units: %; Status: F                        

      Test: MEAN CORPUSCULAR VOLUME; Value: 87.6; Range: 80.0-96.0; Units: fl; Status: F          

      Test: MEAN CORPUSCULAR HEMOGLOBIN; Value: 29.0; Range: 27.0-33.0; Units: pg; Status: F      

      Test: MEAN CORPUSCULAR HGB CONC; Value: 33.1; Range: 32.0-36.5; Units: g/dl; Status: F      

      Test: RED CELL DISTRIBUTION WIDTH; Value: 12.9; Range: 11.5-14.5; Units: %; Status: F       

      Test: PLATELET COUNT, AUTOMATED; Value: 244; Range: 150-450; Units: k/mm3; Status: F        

      Test: NEUTROPHILS %; Value: 82.6; Range: 36.0-66.0; Abnormal: Above high normal; Units:     

      %; Status: F                                                                                

      Test: LYMPH %; Value: 9.7; Range: 24.0-44.0; Abnormal: Below low normal; Units: %;          

      Status: F                                                                                   

      Test: MONO %; Value: 3.7; Range: 0.0-5.0; Units: %; Status: F                               

      Test: EOS %; Value: 2.7; Range: 0.0-3.0; Units: %; Status: F                                

      Test: BASO %; Value: 0.4; Range: 0.0-1.0; Units: %; Status: F                               

      Test: LARGE UNSTAINED CELL %; Value: 0.9; Range: 0.0-4.0; Units: %; Status: F               

      Test: NEUTROPHILS #; Value: 8.5; Range: 1.8-7.7; Abnormal: Above high normal; Units:        

      K/mm3; Status: F                                                                            

      Test: LYMPH #; Value: 1.0; Range: 1.5-6.5; Abnormal: Below low normal; Units: K/mm3;        

      Status: F                                                                                   

      Test: MONO #; Value: 0.4; Range: 0.0-0.8; Units: K/mm3; Status: F                           

      Test: EOS #; Value: 0.3; Range: 0.0-0.50; Units: K/mm3; Status: F                           

      Test: BASO #; Value: 0.0; Range: 0.0-0.2; Units: K/mm3; Status: F                           

      Test: LARGE UNSTAINED CELL #; Value: 0.1; Range: 0.0-0.4; Units: K/mm3; Status: F           

Lab Order: Lipase; SPEC'M 17 11:02                                                          

      Test: LIPASE; Value: 107; Range: ; Units: U/L; Status: F                              

Lab Order: Liver Profile; SPEC'M 17 11:02                                                   

      Test: AST/SGOT; Value: 9; Range: 15-37; Abnormal: Below low normal; Units: U/L; Status:     

      F                                                                                           

      Test: ALT/SGPT; Value: 20; Range: 12-78; Units: U/L; Status: F                              

      Test: ALKALINE PHOSPHATASE; Value: 74; Range: ; Units: U/L; Status: F                 

      Test: BILIRUBIN,TOTAL; Value: 0.5; Range: 0.2-1.0; Units: MG/DL; Status: F                  

      Test: BILIRUBIN,DIRECT; Value: 0.1; Range: 0.0-0.2; Units: MG/DL; Status: F                 

      Test: TOTAL PROTEIN; Value: 8.0; Range: 6.4-8.2; Units: GM/DL; Status: F                    

      Test: ALBUMIN; Value: 4.5; Range: 3.2-5.2; Units: GM/DL; Status: F                          

      Test: ALBUMIN/GLOBULIN RATIO; Value: 1.29; Range: 1.00-1.93; Status: F                      

Lab Order: Urinalysis; SPEC'M 17 11:37                                                      

      Test: APPEARANCE, URINE; Value: HAZY; Range: CLEAR; Status: F                               

      Test: COLOR, URINE; Value: YELLOW; Range: YELLOW; Status: F                                 

      Test: PH,URINE; Value: 6.0; Range: 5.0-9.0; Units: UNITS; Status: F                         

      Test: SPECIFIC GRAVITY URINE AUTO; Value: 1.017; Range: 1.002-1.035; Status: F              

      Test: PROTEIN, URINE AUTO; Value: 2+; Range: NEGATIVE; Abnormal: Above high normal;         

      Units: mg/dL; Status: F                                                                     

      Test: GLUCOSE, URINE (UA) AUTO; Value: NEGATIVE; Range: NEGATIVE; Units: mg/dL; Status:     

      F                                                                                           

      Test: KETONE, URINE AUTO; Value: NEGATIVE; Range: NEGATIVE; Units: mg/dL; Status: F         

      Test: UROBILINOGEN, URINE AUTO; Value: 0.2; Range: 0.0-2.0; Units: mg/dL; Status: F         

      Test: BILIRUBIN, URINE AUTO; Value: NEGATIVE; Range: NEGATIVE; Status: F                    

      Test: NITRITE, URINE AUTO; Value: NEGATIVE; Range: NEGATIVE; Status: F                      

      Test: LEUKOCYTE ESTERASE, URINE AUTO; Value: TRACE; Range: NEGATIVE; Abnormal: Above        

      high normal; Status: F                                                                      

      Test: BLOOD, URINE BLOOD; Value: 3+; Range: NEGATIVE; Abnormal: Above high normal;          

      Status: F                                                                                   

      Test: WBC, URINE AUTO; Value: 6; Range: 0-3; Abnormal: Above high normal; Units: /HPF;      

      Status: F                                                                                   

      Test: RBC, URINE AUTO; Value: TNTC; Range: 0-3; Abnormal: Above high normal; Units:         

      /HPF; Status: F                                                                             

      Test: BACTERIA, URINE AUTO; Value: NEGATIVE; Range: NEGATIVE; Status: F                     

      Test: SQUAMOUS EPITHELIAL CELL UR AU; Value: 1; Range: 0-6; Units: /HPF; Status: F          

      Test: MUCUS, URINE; Value: MODERATE; Range: NEGATIVE; Status: F                             

      Test: HYALINE CAST, URINE AUTO; Value: 0; Range: 0-1; Units: /LPF; Status: F                

      Test: CALCIUM OXALATE CRYSTALS; Value: SMALL; Range: NONE; Status: F                        

Lab Order: Urine Culture; SPEC'M 17 11:37                                                   

      Test: URINE CULTURE; Value: URINE CULTURE RESULT NO GROWTH; Status: F                       

                                                                                                  

Radiology Order: CT ABD & PELVIS: No Contrast                                                   

      Test: CT ABD & PELVIS: No Contrast                                                        

      REASON FOR EXAMINATION: Renal colic; Clinical: Right flank pain.; ; Findings:; Lung         

      bases clear. Visualized heart and pericardium normal.; ; Liver, spleen, pancreas,           

      gallbladder, bilateral adrenal glands and kidneys are; normal for noncontrast               

      evaluation. The enteric system is without obstruction or; acute inflammatory process.       

      Mildly prominent lymph nodes in the right lower; quadrant/pericecal region may reflect      

      mesenteric adenitis. Pelvis demonstrates; normal bladder and age-appropriate                

      uterus/adnexa. No pelvic fluid/ascites. No; free air. Abdominal aorta normal.               

      Surrounding musculoskeletal structures are; intact.; ; Impression:; 1. Cannot exclude       

      mesenteric adenitis.; 2. Otherwise unremarkable noncontrast CT of the abdomen and           

      pelvis. No evidence; for hydroureteronephrosis or intra renal/obstructing ureteral          

      calculi.; 3. No ascites.; ; ; Signed by; Archie Rivers MD 2017 12:30 P;              

Outcome:                                                                                          

12:49 Discharge ordered by Provider.                                                          cc10

12:55 Discharge Assessment: Patient awake, alert and oriented x 3. No cognitive and/or        hs1 

      functional deficits noted. Patient verbalized understanding of disposition                  

      instructions. patient administered narcotics - yes. Pt provided with safe discharge.        

      The following High Risk Discharge criteria are identified: None. Discharged to home         

      ambulatory. Condition: stable. Discharge instructions given to patient, Instructed on       

      discharge instructions, follow up and referral plans. medication usage, Demonstrated        

      understanding of instructions, medications, Pt was receptive of discharge instructions/     

      teaching. Prescriptions given X 1. CT Study completed. Property sent home with patient.     

12:56 Patient left the ED.                                                                    hs1 

                                                                                                  

Signatures:                                                                                       

Dispatcher [facility]                           EDMS                                                 

Yovani ShahRN                          RN   Bon Robertson RN                   RN   mlb1                                                 

Stonyford, Renetta, RN                    RN   hs1                                                  

Melanie Harden, RN                    RN   jc4                                                  

Michael Wagner                                jml1                                                 

Tuan Matias PA-C                    PAADALBERTO cc10                                                 

Kaya Min, Unit Clerk          Unit sar1                                                 

Mike Kiran                               rn1                                                  

Montserrat Quezada                              jp5                                                  

Meaghan Mejia                                                  

                                                                                                  

**************************************************************************************************



*** Chart Complete ***
MTDD

## 2017-05-22 ENCOUNTER — HOSPITAL ENCOUNTER (OUTPATIENT)
Dept: HOSPITAL 53 - M SFHCLERA | Age: 27
End: 2017-05-22
Attending: FAMILY MEDICINE
Payer: MEDICAID

## 2017-05-22 DIAGNOSIS — Z12.4: Primary | ICD-10-CM

## 2017-05-22 LAB — INTERNAL CH/GC CONTROL: (no result)

## 2017-08-02 ENCOUNTER — HOSPITAL ENCOUNTER (OUTPATIENT)
Dept: HOSPITAL 53 - M SFHCLERA | Age: 27
End: 2017-08-02
Attending: NURSE PRACTITIONER
Payer: COMMERCIAL

## 2017-08-02 DIAGNOSIS — R30.0: Primary | ICD-10-CM

## 2017-11-22 ENCOUNTER — HOSPITAL ENCOUNTER (OUTPATIENT)
Dept: HOSPITAL 53 - M SFHCLERA | Age: 27
End: 2017-11-22
Attending: NURSE PRACTITIONER
Payer: MEDICAID

## 2017-11-22 DIAGNOSIS — N30.00: Primary | ICD-10-CM

## 2018-04-30 ENCOUNTER — HOSPITAL ENCOUNTER (OUTPATIENT)
Dept: HOSPITAL 53 - M SFHCLERA | Age: 28
End: 2018-04-30
Attending: FAMILY MEDICINE
Payer: MEDICAID

## 2018-04-30 DIAGNOSIS — E03.9: Primary | ICD-10-CM

## 2018-04-30 PROCEDURE — 84443 ASSAY THYROID STIM HORMONE: CPT

## 2018-05-01 LAB — THYROID STIMULATING HORMONE: 0.35 UIU/ML (ref 0.36–3.74)

## 2018-07-18 ENCOUNTER — HOSPITAL ENCOUNTER (OUTPATIENT)
Dept: HOSPITAL 53 - M SFHCLERA | Age: 28
End: 2018-07-18
Attending: FAMILY MEDICINE
Payer: MEDICAID

## 2018-07-18 DIAGNOSIS — E03.9: Primary | ICD-10-CM

## 2018-07-18 LAB — THYROID STIMULATING HORMONE: 1.14 UIU/ML (ref 0.36–3.74)

## 2018-07-18 PROCEDURE — 84443 ASSAY THYROID STIM HORMONE: CPT

## 2018-07-24 ENCOUNTER — HOSPITAL ENCOUNTER (OUTPATIENT)
Dept: HOSPITAL 53 - M SFHCLERA | Age: 28
End: 2018-07-24
Attending: FAMILY MEDICINE
Payer: COMMERCIAL

## 2018-07-24 DIAGNOSIS — Z11.3: ICD-10-CM

## 2018-07-24 DIAGNOSIS — Z01.419: Primary | ICD-10-CM

## 2018-07-24 DIAGNOSIS — Z11.51: ICD-10-CM

## 2018-07-24 PROCEDURE — 86780 TREPONEMA PALLIDUM: CPT

## 2018-07-25 LAB
CHLAMYDIA DNA AMPLIFICATION: NEGATIVE
GC DNA AMPLIFICATION: NEGATIVE
HIV 1&2 SCREEN CENTAUR: NEGATIVE
SYPHILIS: NONREACTIVE

## 2018-07-27 LAB — HPV LOW VOL RFLX: (no result)

## 2019-07-22 ENCOUNTER — HOSPITAL ENCOUNTER (OUTPATIENT)
Dept: HOSPITAL 53 - M SFHCLERA | Age: 29
End: 2019-07-22
Attending: FAMILY MEDICINE
Payer: COMMERCIAL

## 2019-07-22 DIAGNOSIS — E03.9: Primary | ICD-10-CM

## 2019-08-15 ENCOUNTER — HOSPITAL ENCOUNTER (OUTPATIENT)
Dept: HOSPITAL 53 - M LAB REF | Age: 29
End: 2019-08-15
Attending: ADVANCED PRACTICE MIDWIFE
Payer: COMMERCIAL

## 2019-08-15 DIAGNOSIS — Z12.4: Primary | ICD-10-CM

## 2020-09-29 ENCOUNTER — HOSPITAL ENCOUNTER (OUTPATIENT)
Dept: HOSPITAL 53 - M SFHCWAGY | Age: 30
End: 2020-09-29
Attending: NURSE PRACTITIONER
Payer: COMMERCIAL

## 2020-09-29 DIAGNOSIS — Z12.4: Primary | ICD-10-CM

## 2020-09-29 DIAGNOSIS — N76.0: ICD-10-CM

## 2020-10-06 ENCOUNTER — HOSPITAL ENCOUNTER (OUTPATIENT)
Dept: HOSPITAL 53 - M LAB REF | Age: 30
End: 2020-10-06
Attending: PHYSICIAN ASSISTANT
Payer: COMMERCIAL

## 2020-10-06 ENCOUNTER — HOSPITAL ENCOUNTER (OUTPATIENT)
Dept: HOSPITAL 53 - M SFHCADAM | Age: 30
End: 2020-10-06
Attending: FAMILY MEDICINE
Payer: COMMERCIAL

## 2020-10-06 DIAGNOSIS — J02.9: Primary | ICD-10-CM

## 2020-10-06 DIAGNOSIS — E03.9: ICD-10-CM

## 2020-10-06 DIAGNOSIS — Z00.00: Primary | ICD-10-CM

## 2020-10-06 LAB
T4 FREE SERPL-MCNC: 1.08 NG/DL (ref 0.76–1.46)
TSH SERPL DL<=0.005 MIU/L-ACNC: 1.63 UIU/ML (ref 0.36–3.74)

## 2021-02-05 ENCOUNTER — HOSPITAL ENCOUNTER (EMERGENCY)
Dept: HOSPITAL 53 - M ED | Age: 31
Discharge: HOME | End: 2021-02-05
Payer: COMMERCIAL

## 2021-02-05 VITALS — HEIGHT: 64 IN | WEIGHT: 166.23 LBS | BODY MASS INDEX: 28.38 KG/M2

## 2021-02-05 VITALS — DIASTOLIC BLOOD PRESSURE: 57 MMHG | SYSTOLIC BLOOD PRESSURE: 104 MMHG

## 2021-02-05 DIAGNOSIS — O21.9: Primary | ICD-10-CM

## 2021-02-05 DIAGNOSIS — Z3A.08: ICD-10-CM

## 2021-02-05 DIAGNOSIS — O99.611: ICD-10-CM

## 2021-02-05 DIAGNOSIS — O99.281: ICD-10-CM

## 2021-02-05 DIAGNOSIS — O99.331: ICD-10-CM

## 2021-02-05 DIAGNOSIS — O99.321: ICD-10-CM

## 2021-02-05 LAB
ALBUMIN SERPL BCG-MCNC: 3.7 GM/DL (ref 3.2–5.2)
ALT SERPL W P-5'-P-CCNC: 27 U/L (ref 12–78)
BASOPHILS # BLD AUTO: 0 10^3/UL (ref 0–0.2)
BASOPHILS NFR BLD AUTO: 0.3 % (ref 0–1)
BILIRUB CONJ SERPL-MCNC: 0.1 MG/DL (ref 0–0.2)
BILIRUB SERPL-MCNC: 0.5 MG/DL (ref 0.2–1)
EOSINOPHIL # BLD AUTO: 0.1 10^3/UL (ref 0–0.5)
EOSINOPHIL NFR BLD AUTO: 0.6 % (ref 0–3)
HCT VFR BLD AUTO: 38.8 % (ref 36–47)
HGB BLD-MCNC: 12.6 G/DL (ref 12–15.5)
LIPASE SERPL-CCNC: 83 U/L (ref 73–393)
LYMPHOCYTES # BLD AUTO: 1.5 10^3/UL (ref 1.5–5)
LYMPHOCYTES NFR BLD AUTO: 11.7 % (ref 24–44)
MAGNESIUM SERPL-MCNC: 1.8 MG/DL (ref 1.8–2.4)
MCH RBC QN AUTO: 28.9 PG (ref 27–33)
MCHC RBC AUTO-ENTMCNC: 32.5 G/DL (ref 32–36.5)
MCV RBC AUTO: 89 FL (ref 80–96)
MONOCYTES # BLD AUTO: 0.4 10^3/UL (ref 0–0.8)
MONOCYTES NFR BLD AUTO: 3.5 % (ref 0–5)
NEUTROPHILS # BLD AUTO: 10.4 10^3/UL (ref 1.5–8.5)
NEUTROPHILS NFR BLD AUTO: 83.4 % (ref 36–66)
PLATELET # BLD AUTO: 316 10^3/UL (ref 150–450)
PROT SERPL-MCNC: 7.1 GM/DL (ref 6.4–8.2)
RBC # BLD AUTO: 4.36 10^6/UL (ref 4–5.4)
RSV RNA NPH QL NAA+PROBE: NEGATIVE
WBC # BLD AUTO: 12.4 10^3/UL (ref 4–10)

## 2021-02-05 NOTE — CCD
Summarization of Episode Note

                             Created on: 2021



KYLE MARTINEZ

External Reference #: [id number]

: 1990

Sex: Female



Demographics





                          Address                   [address]

[place]  [address]

 

                          Home Phone                [phone]

 

                          Preferred Language        Unknown

 

                          Marital Status            Unknown

 

                          [employer] Affiliation     Unknown

 

                          Race                      White

 

                          Ethnic Group              Not  or 





Author





                          Author                    [facility]

ems

 

                          Organization              [facility]

ems

 

                          Address                   Unknown

 

                          Phone                     Unavailable







Support





                Name            Relationship    Address         Phone

 

                    KYLE MARTINEZ                [address]

[place], [place]  [id number] [phone]

 

                NANETTE DREW      ECON            Unknown         [phone]







Care Team Providers





                    Care Team Member Name Role                Phone

 

                    Phyllis-TartellJulio CesarZully Unavailable         [phone]







PROBLEMS





          Type      Condition ICD9-CM Code JQK58-YR Code Onset Dates Condition S

tatus SNOMED 

Code                                    Notes

 

        Problem Hypothyroidism         E03.9           Active  55083714  

 

        Problem Depression         F32.9           Active  [id number]  







ALLERGIES

No Known Allergies



ENCOUNTERS from 1990 to 2021





             Encounter    Location     Date         Provider     Diagnosis

 

                [facility]      [address]  [place] [address] 

21    Zully 

Phyllis-Tartell                         Hypothyroidism E03.9 and Positive pregna

ncy test Z32.01







IMMUNIZATIONS





                Vaccine         Route           Administration Date Status

 

                Influenza (18 yrs & older) Flublok IM Intramuscular Oct 16, 2019

    Administered

 

                HPV9 0.5mL (Gardasil 9) IM Intramuscular 2018   Adminis

tered

 

                HPV9 0.5mL (Gardasil 9) IM Intramuscular 2017   Adminis

tered

 

                Influenza (6mo & up) Fluzone IM Intramuscular Nov 15, 2016    Ad

ministered

 

                Influenza (6mo & up) Fluzone Unknown         Feb 15, 2016    Oth

ers

 

                Influenza (6mo & up) Fluzone Unknown         2016    Ot

ers







SOCIAL HISTORY

Tobacco Use:



                    Social History Observation Description         Date

 

                    Details (start date - stop date) Former Smoker        



Sex Assigned At Birth:



                          Social History Observation Description

 

                          Sex Assigned At Birth     Unknown



Audit



                    Question            Answer              Notes

 

                    Total Score:        1                    

 

                    Interpretation:     Alcohol Education    



Sexual Hx:



                    Question            Answer              Notes

 

                    Had sex in the last 12 months (vaginal, oral, or anal)? Yes 

                 

 

                    LMP:                nexplanon            

 

                    Have you ever had an STD? Yes                  

 

                    Prevention Strategies discussed: Other                

 

                    with                Men only             

 

                    Use protection?     No                   

 

                    Other?              No                   

 

                    Herpes?             No                   

 

                    Syphilis?           No                   

 

                    GC?                 No                   

 

                    Chlamydia?          Yes                  



Drug and Alcohol



                    Question            Answer              Notes

 

                    Total Score:        0                    

 

                    Interpretation:     No problems reported  



Alcohol Screening:



                    Question            Answer              Notes

 

                    Did you have a drink containing alcohol in the past year? Ye

s                  

 

                    Points              3                    

 

                    Interpretation      Positive             

 

                          How often did you have six or more drinks on one occas

ion in the past year? Less

than monthly (1 point)                   

 

                                        How many drinks did you have on a typica

l day when you were drinking in the past

year?                     3 or 4 (1 point)           

 

                          How often did you have a drink containing alcohol in t

he past year? Monthly or 

less (1 point)                           



Tobacco Use:



                    Question            Answer              Notes

 

                    Are you a:          former smoker       Quit 2018

 

                    How long has it been since you last smoked? 1-5 years       

     







REASON FOR REFERRAL

No Information



VITAL SIGNS





                    Weight              161 lbs             

 

                    Height              64 in               

 

                    BMI                 27.63 kg/m2         

 

                    Heart Rate          96 /min             

 

                    Respiratory Rate    18 /min             

 

                    Temperature         97.3 degrees Fahrenheit 

 

                    Oximetry            99                  

 

                    Blood pressure systolic 105 mm Hg           

 

                    Blood pressure diastolic 68 mm Hg            







MEDICATIONS





           Medication SIG (Take, Route, Frequency, Duration) Notes      Start Da

te End Date   

Status

 

           Flagyl 500 MG 1 tablet Orally two times a day            

            Not-Taking

 

           Womens One Daily  Orally                                     Active

 

                          Levothyroxine Sodium 100 MCG 1 tablet in the morning o

n an empty stomach Orally 

Once a day for 90 day(s)                                     Active

 

                          Fluoxetine HCl 20 MG      TAKE 1 CAPSULE BY MOUTH ONCE

 DAILY IN THE MORNING Oral for 

30                                                              Active







PROCEDURES

No Information



RESULTS





                    Component           Value               Reference Range

 

                                        Pregnancy Test, Urine

Reviewed date:2021 10:11:34

Interpretation:

Performing Lab:[facility], Phone:[phone],[place] [address] 

 

                    Pregnancy Test, Urine positive            Negative -  

 

                    Internal QC Acceptable (Y/N) yes                  







REASON FOR VISIT

wants preg test/+ at home



MEDICAL (GENERAL) HISTORY





                    Type                Description         Date

 

                    Medical History     hypothyroidism       

 

                    Medical History     PTSD, depression, anxiety  

 

                    Medical History     BV                   

 

                    Medical History     Hx of UTIs           

 

                    Surgical History    tissue removed right breast (old scar ti

ssue) 

 

                    Hospitalization History childbirth           







Goals Section

No Information



Health Concerns

No Information



MEDICAL EQUIPMENT

No Information



MENTAL STATUS

No Information



FUNCTIONAL STATUS

No Information



ASSESSMENTS





             Encounter Date Diagnosis    Assessment Notes Treatment Notes Treatm

ent Clinical 

Notes

 

                    Hypothyroidism (ICD-10 - E03.9)                 



                                        



Thyoid normal prepregnacy.  Labwork ordered for 6 weeks now, increased thyroid 
now because of increased needs during pregnancy.

 

                    Positive pregnancy test (ICD-10 - Z32.01)       

          



                                        



Confirmed positive in office today.  She is already taking a prenatal vitamin.







PLAN OF TREATMENT

Medication



                Medication Name Sig             Start Date      Stop Date

 

                          Levothyroxine Sodium 100 MCG 1 tablet in the morning o

n an empty stomach Orally 

Once a day for 90 day(s)                 



Treatment Notes



                    Assessment          Notes               Clinical Notes

 

                    Hypothyroidism                          Thyoid normal prepre

gnacy.  Labwork ordered for 6 weeks now, 

increased thyroid now because of increased needs during pregnancy.

 

                    Positive pregnancy test                     Confirmed positi

ve in office today.  She is already 

taking a prenatal vitamin.



Future Test



                          Test Name                 Order Date

 

                          TSH                       2021



Next Appt



                                        Details

 

                                        4 Weeks Reason:f/u thyroid

 

                                        Provider Name:Naty Burton, 

2021 02:20:00 PM, [address] [place], [place], [address], [phone]

 

                                        Provider Name:Zully Cottrell,

 2021 08:15:00 AM, [address]  [address], [address], [phone]



Follow Up:4 Weeksf/u thyroid



Insurance Providers





             Payer Name   Payer Address Payer Phone  Insured Name Patient Relati

onship to 

Insured                   Coverage Start Date       Coverage End Date

 

                [facility] [address] [address]  [facility] [id number]

[phone]    

KYLE MARTINEZ    self

## 2021-02-05 NOTE — CCD
Summarization Of Episode

                             Created on: 2021



JUAN KYLE VERONICA

External Reference #: [id number]

: 1990

Sex: Female



Demographics





                          Address                   [address] [place]  [address]

 

                          Home Phone                [phone]

 

                          Preferred Language        English

 

                          Marital Status            Single

 

                          Gnosticism Affiliation     NONE

 

                          Race                      White

 

                          Ethnic Group              Not  or 





Author





                          Author                    [facility]

 

                          Organization              [facility]

 

                          Address                   Unknown

 

                          Phone                     Unavailable







Support





                Name            Relationship    Address         Phone

 

                    JEFFCODSS           Next Of Kin         [address] [place] [address]  [address]                    [phone]

 

                    AMERICAN GREETINGS  Next Of Kin         [address] [place] [address]  [address]                    [phone]

 

                    FUCILLOWAT          [place]  [address]                    [phone]

 

                    CONVERGY'S          Next Of Kin         [address] [place] [address]  [address]                    [phone]

 

                    STREAM              Next Of Kin         [address] [place] [address]  [address]                    [phone]

 

                    DESIRE               [place]  [address]                    [phone]

 

                UE              Next Of Kin     Unknown         Unavailable

 

                    NANETTE DREW         Next Of Kin         [address] 

[place]  [address]                  [phone]

 

                AKOSUA DREW    Next Of Kin     Unknown         [phone]

 

                    KYLE DIALLO     Next Of Kin         [address] 

[place], [place]  [id number]              [phone]

 

                AKOSUA DREW    Next Of Kin     Unknown         [phone]

 

                    NANETTE DREW         ECON                [address]

[place]  [address]                    [phone]







Care Team Providers





                    Care Team Member Name Role                Phone

 

                    RING, K JEAN-CLAUDE PA  Unavailable         Unavailable

 

                    RING, K JEAN-CLAUDE PA  Unavailable         Unavailable

 

                    RING, K JEAN-CLAUDE PA  Unavailable         Unavailable

 

                    RING, K JEAN-CLAUDE PA  Unavailable         Unavailable

 

                    RING, K JEAN-CLAUDE PA  Unavailable         Unavailable

 

                    RING, K JEAN-CLAUDE PA  Unavailable         Unavailable

 

                    RING, K JEAN-CLAUDE PA  Unavailable         Unavailable

 

                    RING, K JEAN-CLAUDE PA  Unavailable         Unavailable

 

                    RING, K JEAN-CLAUDE PA  Unavailable         Unavailable

 

                    RING, K JEAN-CLAUDE PA  Unavailable         Unavailable

 

                    RING, K JEAN-CLAUDE PA  Unavailable         Unavailable

 

                    RING, K JEAN-CLAUDE PA  Unavailable         Unavailable

 

                    RING, K JEAN-CLAUDE PA  Unavailable         Unavailable

 

                    RING, K JEAN-CLAUDE PA  Unavailable         Unavailable

 

                    RING, K JEAN-CLAUDE PA  Unavailable         Unavailable

 

                    RING, K JEAN-CLAUDE PA  Unavailable         Unavailable

 

                    RING, K JEAN-CLAUDE PA  Unavailable         Unavailable

 

                    RING, K JEAN-CLAUDE PA  Unavailable         Unavailable

 

                    RING, K JEAN-CLAUDE PA  Unavailable         Unavailable

 

                    RING, K JEAN-CLAUDE PA  Unavailable         Unavailable

 

                    RING, K JEAN-CLAUDE PA  Unavailable         Unavailable

 

                    Darling, Susan Peggy PA Unavailable         Unavailable

 

                    Darling, Susan Peggy PA Unavailable         Unavailable

 

                    Darling, Susan Peggy PA Unavailable         Unavailable

 

                    Darling, Susan Peggy PA Unavailable         Unavailable

 

                    Darling, Susan Peggy PA Unavailable         Unavailable

 

                    Darling, Susan Peggy PA Unavailable         Unavailable

 

                    Darling, Susan Peggy PA Unavailable         Unavailable

 

                    Darling, Susan Peggy PA Unavailable         Unavailable

 

                    Darling, Susan Peggy PA Unavailable         Unavailable

 

                    Darling, Susan Peggy PA Unavailable         Unavailable



                                  



Re-disclosure Warning

          The records that you are about to access may contain information from 
federally-assisted alcohol or drug abuse programs. If such information is 
present, then the following federally mandated warning applies: This information
has been disclosed to you from records protected by federal confidentiality 
rules (42 CFR part 2). The federal rules prohibit you from making any further 
disclosure of this information unless further disclosure is expressly permitted 
by the written consent of the person to whom it pertains or as otherwise 
permitted by 42 CFR part 2. A general authorization for the release of medical 
or other information is NOT sufficient for this purpose. The Federal rules 
restrict any use of the information to criminally investigate or prosecute any 
alcohol or drug abuse patient.The records that you are about to access may 
contain highly sensitive health information, the redisclosure of which is 
protected by Article 27-F of the [facility] Public Health law. If you 
continue you may have access to information: Regarding HIV / AIDS; Provided by 
facilities licensed or operated by the [facility] Office of Mental Health; 
or Provided by the [facility] Office for People With Developmental 
Disabilities. If such information is present, then the following [place] 
mandated warning applies: This information has been disclosed to you from 
confidential records which are protected by state law. State law prohibits you 
from making any further disclosure of this information without the specific 
written consent of the person to whom it pertains, or as otherwise permitted by 
law. Any unauthorized further disclosure in violation of state law may result in
a fine or [facility]PC sentence or both. A general authorization for the release of 
medical or other information is NOT sufficient authorization for further disc
losure.                                                                         
    



Family History

          



             Family Member Name Family Member Gender Family Member Status Date o

f Status 

Description                             Data Source(s)

 

           Unknown    Unknown    Problem                          [facility] ([facility])

 

           Unknown    Unknown    Problem                          [facility] ([facility]

dy Rob Workman MD )



                                                                                
                 



Encounters

          



           Encounter  Providers  Location   Date       Indications Data Source(s

)

 

                Unknown                         [address] [facility] [id number] 2021 12:00:00 AM 

EST                                                 eCW1 ([facility])

 

                Outpatient                      [address] [place] [phone] 2021 12:00:00 AM

EST                                                 eCW1 ([facility])

 

                Outpatient      Attender: Peggy Chery

deidre 10/06/2020 

08:45:00 AM EDT                                     [facility] ([facility]

 

                Outpatient                      [address] [place] [phone] 2020 12:00:00 AM

EDT                                                 eCW1 ([facility])

 

                Outpatient      Attender: JEAN-CLAUDE Pacheco Primary 

2020 05:15:00 

PM EDT                                              [facility] ([facility]

[facility])

 

                [facility]                 [address] [place] [phone] 2020 

12:00:00 AM EST                                     eCW1 ([facility])

 

                Outpatient      Attender: JEAN-CLAUDE Pacheco Primary 

2020 07:15:00 

AM EST                                              MED[facility] ([facility]

[facility])



                                                                                
                                                                   



Medications

          



          Medication Brand Name Start Date Product Form Dose      Route     Admi

nistrative 

Instructions Pharmacy Instructions Status     Indications Reaction   Description

 Data 

Source(s)

 

          100 mcg             2021 12:00:00 AM EST tablet    30           

       TAKE ONE TABLET BY MOUTH EVERY 

MORNING ON AN EMPTY STOMACH             TAKE ONE TABLET BY MOUTH EVERY MORNING O

N AN EMPTY 

STOMACH      SOLD: 2021                                        [facility]

s

 

                          Levothyroxine Sodium 0.1 MG Oral Tablet Levothyroxine 

Sodium 100 MCG 

Levothyroxine Sodium 100 MCG 2021 12:00:00 AM EST                         

           active               

Levothyroxine Sodium 100 MCG            eCW1 ([facility])

 

                          Levothyroxine Sodium 0.1 MG Oral Tablet Levothyroxine 

Sodium 100 MCG 

Levothyroxine Sodium 100 MCG 2021 12:00:00 AM EST                         

           active               

Levothyroxine Sodium 100 MCG            eCW1 ([facility])

 

                    Metronidazole 500 MG Oral Tablet [Flagyl] Flagyl 500 MG Flag

yl 500 MG       2020

 12:00:00 AM EDT        1.0 {tablet}                      suspended             

  Flagyl 500 MG eCW1 

([facility])

 

           Metronidazole 500 MG Oral Tablet [Flagyl] Flagyl     2020 12:00

:00 AM EDT                       

ORAL                          completed                               MEDENT ([facility])

 

                    Metronidazole 500 MG Oral Tablet [Flagyl] Flagyl 500 MG Flag

yl 500 MG       2020

 12:00:00 AM EDT        1.0 {tablet}                      suspended             

  Flagyl 500 MG eCW1 

([facility])

 

                    Metronidazole 500 MG Oral Tablet [Flagyl] Flagyl 500 MG Flag

yl 500 MG       2020

 12:00:00 AM EDT        1.0 {tablet}                      active               F

lagyl 500 MG eCW1 ([facility])

 

          500 mg              2020 12:00:00 AM EDT tablet    14           

       TAKE ONE TABLET BY MOUTH TWICE A

 DAY FOR 7 DAYS           TAKE ONE TABLET BY MOUTH TWICE A DAY FOR 7 DAYS SOLD: 

2020

                                                                [facility]

 

        Naproxen 500 MG Oral Tablet Naproxen 2020 12:00:00 AM EST         

        ORAL                    

completed                                                       MEDENT ([facility])

 

          500 mg              2020 12:00:00 AM EST tablet    14           

       TAKE ONE TABLET BY MOUTH EVERY 

12 HOURS AS NEEDED FOR PAIN , MAY TAKE AFTER 6 IN THE EVENING 2020 TAKE ONE

 TABLET BY MOUTH EVERY 12 HOURS AS NEEDED FOR PAIN , MAY TAKE AFTER 6 IN THE 
EVENING 2020 SOLD: 2020                                        [facility]

 

          4 mg                2020 12:00:00 AM EST tablet    14           

       TAKE ONE TABLET BY MOUTH EVERY 8 

HOURS AS NEEDED FOR PAIN                TAKE ONE TABLET BY MOUTH EVERY 8 HOURS A

S NEEDED FOR 

PAIN         SOLD: 2020                                        [facility]

s

 

          tizanidine 4 MG Oral Tablet Tizanidine HCL 2020 12:00:00 AM EST 

                    ORAL       

                      completed                                   [facility] ([facility]



                                                                                
                                                                                
                  



Insurance Providers

          



             Payer name   Policy type / Coverage type Policy ID    Covered party

 ID Covered 

party's relationship to solano Policy Solano             Plan Information

 

          [facility]           2001            SP               

   2001

 

          [facility]           2001            SP               

   2001

 

                    ANSI-Not a Secondary Insurance [id number]

[id number]                               

[id number]

 

                    ANSI-Commercial [id number]        

                       

[id number]

 

                    ANSI-Medicaid [id number]          

                     

[id number]

 

                    ANSI-Medicaid [id number]          

                     

[id number]

 

                    ANSI-Not a Secondary Insurance [id number]

[id number]                               

[id number]

 

                    ANSI-Not a Secondary Insurance [id number]

[id number]                               

[id number]

 

                    ANSI-Medicaid [id number]          

                     

[id number]

 

          [facility] (O) [id number]        

   Self                

[id number]

 

                    ANSI-Medicaid [id number]          

                     

[id number]

 

                    ANSI-Not a Secondary Insurance [id number]

[id number]                               

[id number]

 

          [facility]           [id number]           SP                  [id number]

[id number]

 

                    ANSI-Medicaid [id number]          

                     

[id number]

 

                    ANSI-Not a Secondary Insurance [id number]

[id number]                               

[id number]

 

                    ANSI-Not a Secondary Insurance [id number]

[id number]                               

[id number]

 

                    ANSI-Medicaid [id number]          

                     

[id number]

 

                    ANSI-Medicaid [id number]          

                     

[id number]

 

                    ANSI-Not a Secondary Insurance [id number]

[id number]                               

[id number]

 

          [facility]           [id number]           SP                  [id number]

[id number]

 

          MEDICAID            [id number]            SP                  [id number]

 

          [facility]             [id number]           SP                  [id number]

[id number]

 

          [facility] [place]         [id number]           S                   [id number]

[id number]

 

          [employer]                     Self                 

 

          [employer]                     Self                 

 

          [facility]           [id number]           SP                  

[id number]

 

          SELF PAY            UNAVAILABLE           SP                  UNAVAILA

BLE

 

          [employer] [facility]           [id number]           SP           

       [id number]

 

          [facility]           [id number]           [id number]

                  [id number]

 

                              [id number]                               [id number]



                                                                                
                                                                                
                                                                                
                                                                                
                                                                    



Surgeries/Procedures

          



             Procedure    Description  Date         Indications  Data Source(s)

 

             REMOVE DRUG IMPLANT DEVICE              2020 12:00:00 AM EST 

             eCW1 ([facility])

 

                    Therapeutic, Prophylactic Or Diagnostic Injection Subq/Im   

                  2020 12:00:00 

AM EST                                              [facility] ([facility])



                                                                                
                  



Results

          



                    ID                  Date                Data Source

 

                    [id number]            2021 09:12:00 AM EST [facility]









          Name      Value     Range     Interpretation Code Description Data Eliza

rce(s) Supporting 

Document(s)

 

          SARS-CoV-2 NEGATIVE                                [facility]     

 

                                        This lab was ordered by [facility]

[place] [id number] and reported by [facility]. 









                    ID                  Date                Data Source

 

                    [id number]            2021 12:00:00 AM EST [facility]









          Name      Value     Range     Interpretation Code Description Data Eliza

rce(s) Supporting 

Document(s)

 

          SARS-CoV-2 RT-PCR negative                                [facility]     

 

                                        This lab was ordered by [employer] and re

ported by [employer]. 









                    ID                  Date                Data Source

 

                    [id number]             10/06/2020 09:17:00 AM EDT [facility] ([facility])









          Name      Value     Range     Interpretation Code Description Data Eliza

rce(s) Supporting 

Document(s)

 

           Bacteria identified in Throat by Culture Laboratory test result      

                            [facility] 

([facility])            

 

                                        FULL REPORT IN LAB NOTES (eCW and [facility]

).

NORMAL JT PRESENT



ORGANISM 1: STREPTOCOCCUS GROUP G



QUANTITY OF GROWTH            MODERATE





ORGANISM 1: STREPTOCOCCUS GROUP G

 







                                        Procedure

 

                                          



                                                                                
                                      



Social History

          



           Code       Duration   Value      Status     Description Data Source(s

)

 

           Smoking    2021 12:00:00 AM EST Former Smoker completed  Former

 Smoker eCW1 

([facility])

 

           Smoking    2021 12:00:00 AM EST Former Smoker completed  Former

 Smoker eCW1 

([facility])

 

           Smoking    2020 12:00:00 AM EDT Former Smoker completed  Former

 Smoker eCW1 

([facility])

 

             Smoking      2020 12:00:00 AM EST Patient has never smoked co

mpleted    Patient 

has never smoked                        [facility] ([facility])



                                                                                
                                                



Vital Signs

          



                    ID                  Date                Data Source

 

                    UNK                                      









           Name       Value      Range      Interpretation Code Description Data

 Source(s)

 

           Diastolic blood pressure 68 mm[Hg]                        68 mm[Hg]  

eCW1 ([facility])

 

           Systolic blood pressure 105 mm[Hg]                       105 mm[Hg] e

CW1 ([facility])

 

           Body temperature 97.3 [degF]                       97.3 [degF] eCW1 (

[facility])

 

           Respiratory rate 18 /min                          18 /min    eCW1 ([facility] [place] [facility])

 

           Heart rate 96 /min                          96 /min    eCW1 ([facility])

 

           Body mass index (BMI) [Ratio] 27.63 kg/m2                       27.63

 kg/m2 eCW1 ([facility])

 

           Body height 64 [in_i]                        64 [in_i]  eCW1 ([facility])

 

           Body weight 161 [lb_av]                       161 [lb_av] eCW1 ([facility])

 

           Body mass index (BMI) [Ratio] 26.6 kg/m2                       26.6 k

g/m2 [facility] ([facility])

 

           Body height 64 [in_i]                        64 [in_i]  MEDENT ([facility])

 

                                        5'4" 

 

           Body weight 155.00 [lb_av]                       155.00 [lb_av] MEDEN

T ([facility])

 

           Body temperature 98.7 [degF]                       98.7 [degF] MEDENT

 ([facility])

 

           Oxygen saturation in Arterial blood by Pulse oximetry 99 %           

                  99 %       MED[facility] 

([facility])

 

           Respiratory rate 16 /min                          16 /min    MED[facility] (

[facility])

 

           Heart rate 66 /min                          66 /min    MEDENT ([facility])

 

           Diastolic blood pressure 70 mm[Hg]                        70 mm[Hg]  

MED[facility] ([facility]

 

           Systolic blood pressure 110 mm[Hg]                       110 mm[Hg] M

EDENT ([facility]

 [facility])

 

           Diastolic blood pressure 64 mm[Hg]                        64 mm[Hg]  

eCW1 ([facility])

 

           Systolic blood pressure 108 mm[Hg]                       108 mm[Hg] e

CW1 ([facility])

 

           Body temperature 97.7 [degF]                       97.7 [degF] eCW1 (

[facility])

 

           Respiratory rate 18 /min                          18 /min    eCW1 ([facility])

 

           Heart rate 84 /min                          84 /min    eCW1 ([facility])

 

           Body mass index (BMI) [Ratio] 27.63 kg/m2                       27.63

 kg/m2 W1 ([facility])

 

           Body height 64 [in_i]                        64 [in_i]  eCW1 ([facility])

 

           Body weight 161 [lb_av]                       161 [lb_av] eCW1 ([facility])

 

           Body mass index (BMI) [Ratio] 26.6 kg/m2                       26.6 k

g/m2 MEDENT ([facility])

 

           Body height 64 [in_i]                        64 [in_i]  MEDENT ([facility])

 

                                        5'4" 

 

           Body weight 155.00 [lb_av]                       155.00 [lb_av] MEDEN

T ([facility] 

[facility])

 

           Body temperature 984.0 [degF]                       984.0 [degF] MEDE

NT ([facility] 

[facility])

 

           Oxygen saturation in Arterial blood by Pulse oximetry 99 %           

                  99 %       MEDENT 

([facility])

 

           Respiratory rate 16 /min                          16 /min    MEDENT (

[facility])

 

           Heart rate 69 /min                          69 /min    MEDENT ([facility])

 

           Diastolic blood pressure 80 mm[Hg]                        80 mm[Hg]  

MEDENT ([facility] 

[facility])

 

           Systolic blood pressure 117 mm[Hg]                       117 mm[Hg] M

EDENT ([facility],

 [facility])

 

           Diastolic blood pressure 70 mm[Hg]                        70 mm[Hg]  

eCW1 ([facility])

 

           Systolic blood pressure 122 mm[Hg]                       122 mm[Hg] e

CW1 ([facility])

 

           Body mass index (BMI) [Ratio] 26.6 kg/m2                       26.6 k

g/m2 W1 ([facility])

 

           Body height 64 [in_us]                       64 [in_us] eCW1 ([facility])

 

           Body weight Measured 155 [lb_av]                       155 [lb_av] eC

W1 ([facility])

 

           Body mass index (BMI) [Ratio] 24.5 kg/m2                       24.5 k

g/m[facility]

 

           Body height 64 [in_i]                        64 [in_i]  [facility] ([facility])

 

                                        5'4" 

 

           Body weight 143.00 [lb_av]                       143.00 [lb_av] MEDEN

T ([facility] 

[facility])

 

           Body temperature 97.9 [degF]                       97.9 [degF] [facility]

 ([facility] 

[facility])

 

           Oxygen saturation in Arterial blood by Pulse oximetry 98 %           

                  98 %       [facility] 

([facility])

 

           Respiratory rate 16 /min                          16 /min    [facility] (

[facility])

 

           Heart rate 73 /min                          73 /min    [facility] ([facility])

 

           Diastolic blood pressure 83 mm[Hg]                        83 mm[Hg]  

[facility]

 

           Systolic blood pressure 119 mm[Hg]                       119 mm[Hg] M

[facility]



                                                                                
                  



Patient Treatment Plan of Care

          



             Planned Activity Planned Date Details      Description  Data Source

(s)

 

             Levothyroxine Sodium 0.1 MG Oral Tablet 2021 12:00:00 AM EST 

                          eCW1 

([facility])

 

             Levothyroxine Sodium 0.1 MG Oral Tablet 2021 12:00:00 AM EST 

                          eCW1 

([facility])

## 2021-02-05 NOTE — CCD
Summarization of Episode Note

                             Created on: 2021



KYLE MARTINEZ

External Reference #: [id number]

: 1990

Sex: Female



Demographics





                          Address                   [address]

[place]  [address]

 

                          Home Phone                [phone]

 

                          Preferred Language        Unknown

 

                          Marital Status            Unknown

 

                          Mu-ism Affiliation     Unknown

 

                          Race                      White

 

                          Ethnic Group              Not  or 





Author





                          Author                    [facility]

ems

 

                          Organization              [facility]

ems

 

                          Address                   Unknown

 

                          Phone                     Unavailable







Support





                Name            Relationship    Address         Phone

 

                    KYLE MARTINEZ                [id number]

[place], [place]  [address] [phone]

 

                NANETTE DREW      ECON            Unknown         [phone]







Care Team Providers





                    Care Team Member Name Role                Phone

 

                    Phyllis-TartellJulio CesarZully Unavailable         [phone]







PROBLEMS





          Type      Condition ICD9-CM Code LZG41-GK Code Onset Dates Condition S

tatus SNOMED 

Code                                    Notes

 

        Problem Hypothyroidism         E03.9           Active  90079467  

 

        Problem Depression         F32.9           Active  [id number]  







ALLERGIES

No Known Allergies



ENCOUNTERS from 1990 to 2021





             Encounter    Location     Date         Provider     Diagnosis

 

                [facility]      [address]  [place] [address] 

21    Zully 

Phyllis-Tartell                         Hypothyroidism E03.9







IMMUNIZATIONS





                Vaccine         Route           Administration Date Status

 

                Influenza (18 yrs & older) Flublok IM Intramuscular Oct 16, 2019

    Administered

 

                HPV9 0.5mL (Gardasil 9) IM Intramuscular 2018   Adminis

tered

 

                HPV9 0.5mL (Gardasil 9) IM Intramuscular 2017   Adminis

tered

 

                Influenza (6mo & up) Fluzone IM Intramuscular Nov 15, 2016    Ad

ministered

 

                Influenza (6mo & up) Fluzone Unknown         Feb 15, 2016    Oth

ers

 

                Influenza (6mo & up) Fluzone Unknown         2016    Ot

ers







SOCIAL HISTORY

Tobacco Use:



                    Social History Observation Description         Date

 

                    Details (start date - stop date) Former Smoker        



Sex Assigned At Birth:



                          Social History Observation Description

 

                          Sex Assigned At Birth     Unknown



Audit



                    Question            Answer              Notes

 

                    Total Score:        1                    

 

                    Interpretation:     Alcohol Education    



Sexual Hx:



                    Question            Answer              Notes

 

                    Had sex in the last 12 months (vaginal, oral, or anal)? Yes 

                 

 

                    LMP:                nexplanon            

 

                    Have you ever had an STD? Yes                  

 

                    Prevention Strategies discussed: Other                

 

                    with                Men only             

 

                    Use protection?     No                   

 

                    Other?              No                   

 

                    Herpes?             No                   

 

                    Syphilis?           No                   

 

                    GC?                 No                   

 

                    Chlamydia?          Yes                  



Drug and Alcohol



                    Question            Answer              Notes

 

                    Total Score:        0                    

 

                    Interpretation:     No problems reported  



Alcohol Screening:



                    Question            Answer              Notes

 

                    Did you have a drink containing alcohol in the past year? Ye

s                  

 

                    Points              3                    

 

                    Interpretation      Positive             

 

                          How often did you have six or more drinks on one occas

ion in the past year? Less

than monthly (1 point)                   

 

                                        How many drinks did you have on a typica

l day when you were drinking in the past

year?                     3 or 4 (1 point)           

 

                          How often did you have a drink containing alcohol in t

he past year? Monthly or 

less (1 point)                           



Tobacco Use:



                    Question            Answer              Notes

 

                    Are you a:          former smoker       Quit 2018

 

                    How long has it been since you last smoked? 1-5 years       

     







REASON FOR REFERRAL

No Information



VITAL SIGNS

No information



MEDICATIONS





           Medication SIG (Take, Route, Frequency, Duration) Notes      Start Da

te End Date   

Status

 

           Flagyl 500 MG 1 tablet Orally two times a day            

            Not-Taking

 

           Womens One Daily  Orally                                     Active

 

                          Levothyroxine Sodium 100 MCG 1 tablet in the morning o

n an empty stomach Orally 

Once a day for 90 day(s)                                     Active

 

                          Fluoxetine HCl 20 MG      TAKE 1 CAPSULE BY MOUTH ONCE

 DAILY IN THE MORNING Oral for 

30                                                              Active







PROCEDURES

No Information



RESULTS

No Results



REASON FOR VISIT

levothyroxine 100-90 days



MEDICAL (GENERAL) HISTORY





                    Type                Description         Date

 

                    Medical History     hypothyroidism       

 

                    Medical History     PTSD, depression, anxiety  

 

                    Medical History     BV                   

 

                    Medical History     Hx of UTIs           

 

                    Surgical History    tissue removed right breast (old scar ti

ssue) 

 

                    Hospitalization History childbirth           







Goals Section

No Information



Health Concerns

No Information



MEDICAL EQUIPMENT

No Information



MENTAL STATUS

No Information



FUNCTIONAL STATUS

No Information



ASSESSMENTS





             Encounter Date Diagnosis    Assessment Notes Treatment Notes Treatm

ent Clinical 

Notes

 

                    Hypothyroidism (ICD-10 - E03.9)                 



                                        











PLAN OF TREATMENT

Medication



                Medication Name Sig             Start Date      Stop Date

 

                          Levothyroxine Sodium 100 MCG 1 tablet in the morning o

n an empty stomach Orally 

Once a day for 90 day(s)                 



Next Appt



                                        Details

 

                                        Provider Name:Zully Cottrell,

 2021 08:15:00 AM, [id number], [place], [address], [phone]







Insurance Providers





             Payer Name   Payer Address Payer Phone  Insured Name Patient Relati

onship to 

Insured                   Coverage Start Date       Coverage End Date

 

                [facility] [address] [address]  [facility] [id number]

[phone]    

KYLE MARTINEZ    self

## 2021-02-09 ENCOUNTER — HOSPITAL ENCOUNTER (OUTPATIENT)
Dept: HOSPITAL 53 - M PLALAB | Age: 31
End: 2021-02-09
Attending: ADVANCED PRACTICE MIDWIFE
Payer: COMMERCIAL

## 2021-02-09 DIAGNOSIS — O34.211: Primary | ICD-10-CM

## 2021-02-09 LAB
CHLAMYDIA DNA AMPLIFICATION: NEGATIVE
HCT VFR BLD AUTO: 37.8 % (ref 36–47)
HCV AB SER QL: 0.1 INDEX (ref ?–0.8)
HGB BLD-MCNC: 12.3 G/DL (ref 12–15.5)
HIV 1+2 AB+HIV1 P24 AG SERPL QL IA: NEGATIVE
MCH RBC QN AUTO: 29.3 PG (ref 27–33)
MCHC RBC AUTO-ENTMCNC: 32.5 G/DL (ref 32–36.5)
MCV RBC AUTO: 90 FL (ref 80–96)
N GONORRHOEA RRNA SPEC QL NAA+PROBE: NEGATIVE
PLATELET # BLD AUTO: 342 10^3/UL (ref 150–450)
RBC # BLD AUTO: 4.2 10^6/UL (ref 4–5.4)
T4 FREE SERPL-MCNC: 0.95 NG/DL (ref 0.76–1.46)
TSH SERPL DL<=0.005 MIU/L-ACNC: 5.31 UIU/ML (ref 0.36–3.74)
WBC # BLD AUTO: 11.9 10^3/UL (ref 4–10)

## 2021-03-09 ENCOUNTER — HOSPITAL ENCOUNTER (OUTPATIENT)
Dept: HOSPITAL 53 - M PLALAB | Age: 31
End: 2021-03-09
Attending: OBSTETRICS & GYNECOLOGY
Payer: COMMERCIAL

## 2021-03-09 DIAGNOSIS — E03.9: Primary | ICD-10-CM

## 2021-03-10 ENCOUNTER — HOSPITAL ENCOUNTER (OUTPATIENT)
Dept: HOSPITAL 53 - M PLALAB | Age: 31
End: 2021-03-10
Attending: OBSTETRICS & GYNECOLOGY
Payer: COMMERCIAL

## 2021-03-10 DIAGNOSIS — E03.9: ICD-10-CM

## 2021-03-10 DIAGNOSIS — O99.282: Primary | ICD-10-CM

## 2021-03-10 LAB
T4 FREE SERPL-MCNC: 1.17 NG/DL (ref 0.76–1.46)
TSH SERPL DL<=0.005 MIU/L-ACNC: 1.26 UIU/ML (ref 0.36–3.74)

## 2021-04-09 ENCOUNTER — HOSPITAL ENCOUNTER (OUTPATIENT)
Dept: HOSPITAL 53 - M WHC | Age: 31
End: 2021-04-09
Attending: OBSTETRICS & GYNECOLOGY
Payer: COMMERCIAL

## 2021-04-09 DIAGNOSIS — Z3A.17: ICD-10-CM

## 2021-04-09 DIAGNOSIS — Z53.9: ICD-10-CM

## 2021-04-09 DIAGNOSIS — Z36.89: Primary | ICD-10-CM

## 2021-04-30 ENCOUNTER — HOSPITAL ENCOUNTER (OUTPATIENT)
Dept: HOSPITAL 53 - M PLALAB | Age: 31
End: 2021-04-30
Attending: FAMILY MEDICINE
Payer: COMMERCIAL

## 2021-04-30 DIAGNOSIS — E03.9: Primary | ICD-10-CM

## 2021-05-05 ENCOUNTER — HOSPITAL ENCOUNTER (OUTPATIENT)
Dept: HOSPITAL 53 - M WHC | Age: 31
End: 2021-05-05
Attending: OBSTETRICS & GYNECOLOGY
Payer: COMMERCIAL

## 2021-05-05 DIAGNOSIS — Z3A.21: ICD-10-CM

## 2021-05-05 DIAGNOSIS — Z36.89: Primary | ICD-10-CM

## 2021-05-05 NOTE — REP
INDICATION:

ANATOMY



COMPARISON:

None.



TECHNIQUE:

Transabdominal obstetrical ultrasound with color Doppler evaluation.



FINDINGS:

Examination demonstrates a single live intrauterine pregnancy in cephalic

presentation.  Fetal motion is identified by technologist.  Placenta is noted

posterior and  grade 1 without evidence for placenta previa or abruption.  Amniotic

fluid volume is normal.  Cervix measures 4.0 cm in length and appears closed..



Gestational age by LMP 21 weeks 2 days with MYRTLE 09/13/2021.

Gestational age by current measurements 20 weeks 5 days with MYRTLE 09/17/2021.



FHR equals 128 beats per minute.



BPD:      4.8 cm at        20 weeks 3 days

HC:         18.6 cm at         21 weeks 0 days

AC:         15.3 cm at      20 weeks 4 days

FL:          3.3 cm at      20 weeks 2 days

HL:          3.4 cm at      21 weeks 4 days

HC/AC: 1.21

Estimated fetal weight 358 grams (13thpercentile).



Anatomical assessment demonstrates normal structures including cranium, choroid

plexus, cavum, cerebellum/posterior fossa, facial features, lungs, diaphragm, stomach,

cord insertion/three-vessel cord, kidneys/bladder, spine, and extremities.



IMPRESSION:

Single live intrauterine pregnancy in cephalic presentation demonstrating appropriate

estimated fetal weight/growth.  Limited evaluation of the heart/ventricular outflow

tracts.  Remainder of the anatomical assessment is complete and normal.





<Electronically signed by Archie Rivers > 05/05/21 4745

## 2021-05-10 ENCOUNTER — HOSPITAL ENCOUNTER (OUTPATIENT)
Dept: HOSPITAL 53 - M WHC | Age: 31
End: 2021-05-10
Attending: OBSTETRICS & GYNECOLOGY
Payer: COMMERCIAL

## 2021-05-10 DIAGNOSIS — Z53.9: ICD-10-CM

## 2021-05-10 DIAGNOSIS — Z3A.22: Primary | ICD-10-CM

## 2021-05-24 ENCOUNTER — HOSPITAL ENCOUNTER (OUTPATIENT)
Dept: HOSPITAL 53 - M WHC | Age: 31
End: 2021-05-24
Attending: OBSTETRICS & GYNECOLOGY
Payer: COMMERCIAL

## 2021-05-24 DIAGNOSIS — Z36.2: Primary | ICD-10-CM

## 2021-05-24 DIAGNOSIS — Z3A.24: ICD-10-CM

## 2021-05-24 NOTE — REP
INDICATION:

F/U ANATOMY



COMPARISON:

05/10/2021



TECHNIQUE:

Transabdominal obstetrical ultrasound with color Doppler evaluation.



FINDINGS:

Examination demonstrates a single live intrauterine pregnancy in cephalic

presentation.  Fetal motion is identified by technologist.  Placenta is noted

posterior and  grade 1 without evidence for placenta previa or abruption.  Amniotic

fluid volume is normal.  Cervix measures 4.7 cm in length and appears closed..



Gestational age by LMP 24 weeks 0 days with MYRTLE 09/13/2021.

Gestational age by current measurements 23 weeks 1 day with MYRTLE 09/19/2021.



FHR equals 125 beats per minute.



Estimated fetal weight 564 grams (11thpercentile).



Anatomical assessment demonstrates normal structures including cranium, choroid

plexus, cavum, cerebellum/posterior fossa, facial features, lungs, four-chamber

heart/ventricular outflow tracts, diaphragm, stomach, cord insertion/three-vessel

cord, kidneys/bladder, and extremities.



IMPRESSION:

Single live intrauterine pregnancy in cephalic presentation demonstrating appropriate

estimated fetal weight.  In conjunction with prior examination anatomical assessment

is complete and normal.





<Electronically signed by Archie Rivers > 05/24/21 1600

## 2021-06-09 ENCOUNTER — HOSPITAL ENCOUNTER (OUTPATIENT)
Dept: HOSPITAL 53 - M PLALAB | Age: 31
End: 2021-06-09
Attending: OBSTETRICS & GYNECOLOGY
Payer: COMMERCIAL

## 2021-06-09 DIAGNOSIS — Z36.89: Primary | ICD-10-CM

## 2021-06-09 DIAGNOSIS — Z3A.23: ICD-10-CM

## 2021-06-18 ENCOUNTER — HOSPITAL ENCOUNTER (OUTPATIENT)
Dept: HOSPITAL 53 - M PLALAB | Age: 31
End: 2021-06-18
Attending: OBSTETRICS & GYNECOLOGY
Payer: COMMERCIAL

## 2021-06-18 DIAGNOSIS — Z3A.26: ICD-10-CM

## 2021-06-18 DIAGNOSIS — Z36.89: Primary | ICD-10-CM

## 2021-06-18 LAB
GLUCOSE 1H P 50 G GLC PO SERPL-MCNC: 119 MG/DL (ref ?–140)
HCT VFR BLD AUTO: 31.5 % (ref 36–47)
HGB BLD-MCNC: 10 G/DL (ref 12–15.5)
MCH RBC QN AUTO: 27.5 PG (ref 27–33)
MCHC RBC AUTO-ENTMCNC: 31.7 G/DL (ref 32–36.5)
MCV RBC AUTO: 86.8 FL (ref 80–96)
PLATELET # BLD AUTO: 322 10^3/UL (ref 150–450)
RBC # BLD AUTO: 3.63 10^6/UL (ref 4–5.4)
T4 FREE SERPL-MCNC: 0.95 NG/DL (ref 0.76–1.46)
TSH SERPL DL<=0.005 MIU/L-ACNC: 0.39 UIU/ML (ref 0.36–3.74)
WBC # BLD AUTO: 10.5 10^3/UL (ref 4–10)

## 2021-08-17 ENCOUNTER — HOSPITAL ENCOUNTER (OUTPATIENT)
Dept: HOSPITAL 53 - M SFHCWAGY | Age: 31
End: 2021-08-17
Attending: OBSTETRICS & GYNECOLOGY
Payer: COMMERCIAL

## 2021-08-17 DIAGNOSIS — Z36.89: Primary | ICD-10-CM

## 2021-08-17 DIAGNOSIS — Z3A.00: ICD-10-CM

## 2021-09-02 ENCOUNTER — HOSPITAL ENCOUNTER (OUTPATIENT)
Dept: HOSPITAL 53 - M LABSMTC | Age: 31
End: 2021-09-02
Attending: ANESTHESIOLOGY
Payer: COMMERCIAL

## 2021-09-02 DIAGNOSIS — Z01.812: Primary | ICD-10-CM

## 2021-09-02 DIAGNOSIS — Z20.822: ICD-10-CM

## 2021-09-07 ENCOUNTER — HOSPITAL ENCOUNTER (INPATIENT)
Dept: HOSPITAL 53 - M LDI | Age: 31
LOS: 2 days | Discharge: HOME | DRG: 540 | End: 2021-09-09
Attending: OBSTETRICS & GYNECOLOGY | Admitting: OBSTETRICS & GYNECOLOGY
Payer: COMMERCIAL

## 2021-09-07 VITALS — BODY MASS INDEX: 35.52 KG/M2 | WEIGHT: 213.19 LBS | HEIGHT: 65 IN

## 2021-09-07 VITALS — SYSTOLIC BLOOD PRESSURE: 107 MMHG | DIASTOLIC BLOOD PRESSURE: 67 MMHG

## 2021-09-07 VITALS — SYSTOLIC BLOOD PRESSURE: 133 MMHG | DIASTOLIC BLOOD PRESSURE: 88 MMHG

## 2021-09-07 VITALS — DIASTOLIC BLOOD PRESSURE: 68 MMHG | SYSTOLIC BLOOD PRESSURE: 113 MMHG

## 2021-09-07 VITALS — DIASTOLIC BLOOD PRESSURE: 63 MMHG | SYSTOLIC BLOOD PRESSURE: 117 MMHG

## 2021-09-07 VITALS — DIASTOLIC BLOOD PRESSURE: 71 MMHG | SYSTOLIC BLOOD PRESSURE: 115 MMHG

## 2021-09-07 VITALS — SYSTOLIC BLOOD PRESSURE: 117 MMHG | DIASTOLIC BLOOD PRESSURE: 62 MMHG

## 2021-09-07 VITALS — DIASTOLIC BLOOD PRESSURE: 61 MMHG | SYSTOLIC BLOOD PRESSURE: 109 MMHG

## 2021-09-07 DIAGNOSIS — Z3A.39: ICD-10-CM

## 2021-09-07 DIAGNOSIS — O34.211: Primary | ICD-10-CM

## 2021-09-07 DIAGNOSIS — Z30.2: ICD-10-CM

## 2021-09-07 LAB
HCT VFR BLD AUTO: 37.7 % (ref 36–47)
HGB BLD-MCNC: 12.3 G/DL (ref 12–15.5)
MCH RBC QN AUTO: 28.5 PG (ref 27–33)
MCHC RBC AUTO-ENTMCNC: 32.6 G/DL (ref 32–36.5)
MCV RBC AUTO: 87.5 FL (ref 80–96)
PLATELET # BLD AUTO: 237 10^3/UL (ref 150–450)
RBC # BLD AUTO: 4.31 10^6/UL (ref 4–5.4)
WBC # BLD AUTO: 7.8 10^3/UL (ref 4–10)

## 2021-09-07 PROCEDURE — 0UT70ZZ RESECTION OF BILATERAL FALLOPIAN TUBES, OPEN APPROACH: ICD-10-PCS | Performed by: OBSTETRICS & GYNECOLOGY

## 2021-09-07 RX ADMIN — KETOROLAC TROMETHAMINE SCH MG: 30 INJECTION, SOLUTION INTRAMUSCULAR at 14:15

## 2021-09-07 RX ADMIN — KETOROLAC TROMETHAMINE SCH MG: 30 INJECTION, SOLUTION INTRAMUSCULAR at 19:34

## 2021-09-07 RX ADMIN — Medication SCH TAB: at 09:00

## 2021-09-07 NOTE — ROOPDOC
[facility] Report Of Operation


Report of Operation


DATE OF PROCEDURE: 21





Report of operation





Preoperative diagnosis: 39 weeks gestation, prior  section





Postoperative diagnosis: Same





Procedure: Repeat low transverse  section and bilateral tubal ligation.





Surgeon: Eb Wilcox M.D.





Asst.: Antonia Rowland CNM





EBL: 500 ml.





Urine output: 100 mL's.





Findings: 6 lbs. 15 oz. female infant, Apgar's 8 and 9 g  Surgically absent 

right ovary, normal uterus, normal left ovary and fallopian tube.





Operative summary: Patient taken to the operating room where spinal anesthesia 

was induced. She was prepped and draped sterile fashion in the supine position. 

A Mac catheter was placed. A Pfannenstiel skin incision was made with scalpel.

Fascia was incised and extended bilaterally. The peritoneal cavity was entered. 

A Mobius retractor was placed. A bladder flap was created. A curvilinear 

incision was made in lower uterine segment until Clear fluid was noted. The 

incision was extended manually. The infant was delivered from the vertex 

position without difficulty. Cord was doubly clamped and cut. The infant was 

handed the awaiting nurses. The placenta was expressed. Uterus was closed with 

O-Vicryl in a running locked fashion. A second imbricating layer of Vicryl was 

placed.





Attention was turned to the fallopian tubes. A Zulma clamp was used to grasp 

the fallopian tubes at the midportion. A window was created in the broad 

ligament free tie of 2-0 chromic was placed around the segment of tube.  The 

entire tube was excised bilaterally and sent to pathology.





Peritoneum was closed with 2-0 Vicryl a running fashion. Fascia was closed with 

0 Vicryl in running fashion. Skin was closed 4-0 Monocryl subcuticular sutures. 

Sponge, instrument and needle counts were correct.





Antonia Rowland CNM, assisted with all aspects of the procedure. She helped close 

each layer of the incision and deliver the fetus.











EB WILCOX MD              Sep 7, 2021 09:18

## 2021-09-08 VITALS — SYSTOLIC BLOOD PRESSURE: 111 MMHG | DIASTOLIC BLOOD PRESSURE: 56 MMHG

## 2021-09-08 VITALS — SYSTOLIC BLOOD PRESSURE: 119 MMHG | DIASTOLIC BLOOD PRESSURE: 56 MMHG

## 2021-09-08 VITALS — DIASTOLIC BLOOD PRESSURE: 72 MMHG | SYSTOLIC BLOOD PRESSURE: 124 MMHG

## 2021-09-08 VITALS — SYSTOLIC BLOOD PRESSURE: 121 MMHG | DIASTOLIC BLOOD PRESSURE: 58 MMHG

## 2021-09-08 VITALS — SYSTOLIC BLOOD PRESSURE: 111 MMHG | DIASTOLIC BLOOD PRESSURE: 53 MMHG

## 2021-09-08 VITALS — DIASTOLIC BLOOD PRESSURE: 70 MMHG | SYSTOLIC BLOOD PRESSURE: 122 MMHG

## 2021-09-08 LAB
HCT VFR BLD AUTO: 33.8 % (ref 36–47)
HGB BLD-MCNC: 11.1 G/DL (ref 12–15.5)
MCH RBC QN AUTO: 28.9 PG (ref 27–33)
MCHC RBC AUTO-ENTMCNC: 32.8 G/DL (ref 32–36.5)
MCV RBC AUTO: 88 FL (ref 80–96)
PLATELET # BLD AUTO: 213 10^3/UL (ref 150–450)
RBC # BLD AUTO: 3.84 10^6/UL (ref 4–5.4)
WBC # BLD AUTO: 7.9 10^3/UL (ref 4–10)

## 2021-09-08 RX ADMIN — Medication SCH TAB: at 09:00

## 2021-09-08 RX ADMIN — LEVOTHYROXINE SODIUM SCH MCG: 137 TABLET ORAL at 05:55

## 2021-09-08 RX ADMIN — KETOROLAC TROMETHAMINE SCH MG: 30 INJECTION, SOLUTION INTRAMUSCULAR at 01:37

## 2021-09-08 RX ADMIN — IBUPROFEN SCH MG: 800 TABLET, FILM COATED ORAL at 18:45

## 2021-09-08 RX ADMIN — IBUPROFEN SCH MG: 800 TABLET, FILM COATED ORAL at 11:26

## 2021-09-09 VITALS — SYSTOLIC BLOOD PRESSURE: 142 MMHG | DIASTOLIC BLOOD PRESSURE: 79 MMHG

## 2021-09-09 VITALS — DIASTOLIC BLOOD PRESSURE: 60 MMHG | SYSTOLIC BLOOD PRESSURE: 123 MMHG

## 2021-09-09 VITALS — DIASTOLIC BLOOD PRESSURE: 72 MMHG | SYSTOLIC BLOOD PRESSURE: 128 MMHG

## 2021-09-09 RX ADMIN — Medication SCH TAB: at 09:32

## 2021-09-09 RX ADMIN — IBUPROFEN SCH MG: 800 TABLET, FILM COATED ORAL at 09:33

## 2021-09-09 RX ADMIN — IBUPROFEN SCH MG: 800 TABLET, FILM COATED ORAL at 02:20

## 2021-09-09 RX ADMIN — LEVOTHYROXINE SODIUM SCH MCG: 137 TABLET ORAL at 05:21

## 2021-09-09 NOTE — DS.PDOC
Discharge Summary


General


Date of Admission


Sep 7, 2021 at 05:54


Date of Discharge


2021





Discharge Summary


DATE OF ADMISSION:  2021


DATE OF DISCHARGE:  2021





ADMISSION DIAGNOSIS: 39+ weeks gestation, history of low transverse  

section, satisfied parity.





DISCHARGE DIAGNOSIS: Same





DISCHARGE SUMMARY: The patient was admitted at 39+ weeks gestation for a 

scheduled elective repeat low transverse  section and bilateral tubal 

ligation.  The intraoperative course was uncomplicated. Her postoperative course

was uncomplicated as well. On postoperative day #2, she was meeting all 

discharge criteria. 





PHYSICAL EXAMINATION ON DATE OF DISCHARGE: 


Normotensive. Normal heart rate. Afebrile. 


HEART:  Regular rate and rhythm. No murmurs, gallops, or rubs.


LUNGS: Clear to auscultation bilaterally. 


ABDOMEN:  Soft, nontender, nondistended. 


Incision bandage clean and dry. 


EXTREMITIES: Nonedematous, nontender. 





She was meeting all discharge criteria on postoperative day #2.  We reviewed 

routine fever, infectious, pain, and bleeding precautions. She is to followup in

2 weeks for incision check. Her postoperative medications are Percocet, Motrin, 

and Colace.





Vital Signs/I&Os





Vital Signs








  Date Time  Temp Pulse Resp B/P (MAP) Pulse Ox O2 Delivery O2 Flow Rate FiO2


 


21 13:14    128/72 (90)    


 


21 06:20 97.0 82 16  96 Room Air  











Discharge Medications


Scheduled


Ibuprofen (Ibuprofen) 800 Mg Tablet, 800 MG PO Q8H


Prenatal No122/Iron/Folic Acid (Prenatal Multi Tablet) 1 Each Tablet, 1 TAB PO 

DAILY, (Reported)





Scheduled PRN


Oxycodone HCl/Acetaminophen (Oxycodone-Acetaminophen 5-325) 1 Each Tablet, 1 TAB

PO TIDP PRN for pain





Miscellaneous Medications


Fluoxetine Hcl (Fluoxetine HCl) 20 Mg Capsule, (Reported)


Levothyroxine Sodium (Synthroid) 200 Mcg Tablet, 200 MCG PO, (Reported)





Allergies


Coded Allergies:  


     No Known Allergies (Unverified , 21)











PRASHANTH SIMMONS DO            Sep 9, 2021 14:15

## 2021-09-29 ENCOUNTER — HOSPITAL ENCOUNTER (OUTPATIENT)
Dept: HOSPITAL 53 - M SFHCADAM | Age: 31
End: 2021-09-29
Attending: FAMILY MEDICINE
Payer: COMMERCIAL

## 2021-09-29 DIAGNOSIS — E03.9: ICD-10-CM

## 2021-09-29 DIAGNOSIS — Z00.00: Primary | ICD-10-CM

## 2021-09-29 LAB
ALBUMIN SERPL BCG-MCNC: 3.8 GM/DL (ref 3.2–5.2)
ALT SERPL W P-5'-P-CCNC: 44 U/L (ref 12–78)
BASOPHILS # BLD AUTO: 0.1 10^3/UL (ref 0–0.2)
BASOPHILS NFR BLD AUTO: 0.8 % (ref 0–1)
BILIRUB SERPL-MCNC: 0.5 MG/DL (ref 0.2–1)
BUN SERPL-MCNC: 18 MG/DL (ref 7–18)
CALCIUM SERPL-MCNC: 10.1 MG/DL (ref 8.5–10.1)
CHLORIDE SERPL-SCNC: 106 MEQ/L (ref 98–107)
CHOLEST SERPL-MCNC: 210 MG/DL (ref ?–200)
CHOLEST/HDLC SERPL: 4.57 {RATIO} (ref ?–5)
CO2 SERPL-SCNC: 29 MEQ/L (ref 21–32)
CREAT SERPL-MCNC: 0.87 MG/DL (ref 0.55–1.3)
EOSINOPHIL # BLD AUTO: 0.8 10^3/UL (ref 0–0.5)
EOSINOPHIL NFR BLD AUTO: 8.9 % (ref 0–3)
GFR SERPL CREATININE-BSD FRML MDRD: > 60 ML/MIN/{1.73_M2} (ref 60–?)
GLUCOSE SERPL-MCNC: 77 MG/DL (ref 70–100)
HCT VFR BLD AUTO: 50.9 % (ref 36–47)
HDLC SERPL-MCNC: 46 MG/DL (ref 40–?)
HGB BLD-MCNC: 16.3 G/DL (ref 12–15.5)
LDLC SERPL CALC-MCNC: 128 MG/DL (ref ?–100)
LYMPHOCYTES # BLD AUTO: 2.6 10^3/UL (ref 1.5–5)
LYMPHOCYTES NFR BLD AUTO: 30.3 % (ref 24–44)
MCH RBC QN AUTO: 28.6 PG (ref 27–33)
MCHC RBC AUTO-ENTMCNC: 32 G/DL (ref 32–36.5)
MCV RBC AUTO: 89.3 FL (ref 80–96)
MONOCYTES # BLD AUTO: 0.5 10^3/UL (ref 0–0.8)
MONOCYTES NFR BLD AUTO: 6 % (ref 2–8)
NEUTROPHILS # BLD AUTO: 4.6 10^3/UL (ref 1.5–8.5)
NEUTROPHILS NFR BLD AUTO: 53.8 % (ref 36–66)
NONHDLC SERPL-MCNC: 164 MG/DL
PLATELET # BLD AUTO: 328 10^3/UL (ref 150–450)
POTASSIUM SERPL-SCNC: 4.7 MEQ/L (ref 3.5–5.1)
PROT SERPL-MCNC: 7.5 GM/DL (ref 6.4–8.2)
RBC # BLD AUTO: 5.7 10^6/UL (ref 4–5.4)
SODIUM SERPL-SCNC: 142 MEQ/L (ref 136–145)
T4 FREE SERPL-MCNC: 1.82 NG/DL (ref 0.76–1.46)
TRIGL SERPL-MCNC: 179 MG/DL (ref ?–150)
TSH SERPL DL<=0.005 MIU/L-ACNC: 0.03 UIU/ML (ref 0.36–3.74)
WBC # BLD AUTO: 8.5 10^3/UL (ref 4–10)

## 2021-11-10 ENCOUNTER — HOSPITAL ENCOUNTER (OUTPATIENT)
Dept: HOSPITAL 53 - M SFHCADAM | Age: 31
End: 2021-11-10
Attending: FAMILY MEDICINE
Payer: COMMERCIAL

## 2021-11-10 DIAGNOSIS — E03.9: Primary | ICD-10-CM

## 2022-01-03 ENCOUNTER — HOSPITAL ENCOUNTER (OUTPATIENT)
Dept: HOSPITAL 53 - M PLAIMG | Age: 32
End: 2022-01-03
Attending: PHYSICIAN ASSISTANT
Payer: COMMERCIAL

## 2022-01-03 DIAGNOSIS — R10.84: Primary | ICD-10-CM

## 2022-01-03 NOTE — REP
INDICATION:

GENERALIZED ABDOMINAL PAIN.



COMPARISON:

01/01/2017



TECHNIQUE:

Axial contrast-enhanced images from the lung bases to the pubic symphysis using oral

and 100 cc Isovue 370 intravenous contrast material.  Delayed images of the abdomen

along with coronal and sagittal reformations obtained.



This CT examination was performed using the following dose reduction techniques:

Automated exposure control, adjustment of mA and/or kv according to the patient's

size, and the use of iterative reconstruction technique.



FINDINGS:

Lung bases are clear.



Liver, spleen, pancreas, gallbladder, bilateral adrenal glands and kidneys are normal.



The enteric system including stomach, small, and large bowel appears normal.  No

evidence for obstruction or acute inflammatory process.  Normal terminal ileum and

appendix are identified in the right lower quadrant.  Mildly prominent pericecal lymph

nodes raise the possibility of mesenteric adenitis.



Pelvis demonstrates normal bladder and age-appropriate uterus/adnexa.  Small fat

containing periumbilical hernia noted.



No ascites.  No free air.  No intraperitoneal or retroperitoneal adenopathy.

Abdominal aorta and vasculature appear normal.  Musculoskeletal structures are intact

and without acute osseous abnormality.



IMPRESSION:

1.  Mesenteric adenitis cannot be excluded.

2.  Otherwise essentially normal CT of the abdomen and pelvis.





<Electronically signed by Archie Rivers > 01/03/22 1026

## 2022-03-08 ENCOUNTER — HOSPITAL ENCOUNTER (EMERGENCY)
Dept: HOSPITAL 53 - M ED | Age: 32
Discharge: HOME | End: 2022-03-08
Payer: COMMERCIAL

## 2022-03-08 VITALS
SYSTOLIC BLOOD PRESSURE: 120 MMHG | DIASTOLIC BLOOD PRESSURE: 75 MMHG | BODY MASS INDEX: 30.05 KG/M2 | HEIGHT: 65 IN | WEIGHT: 180.38 LBS

## 2022-03-08 DIAGNOSIS — K58.8: Primary | ICD-10-CM

## 2022-03-08 DIAGNOSIS — E03.9: ICD-10-CM

## 2022-03-08 DIAGNOSIS — Z79.890: ICD-10-CM

## 2022-03-08 DIAGNOSIS — F32.A: ICD-10-CM

## 2022-03-08 DIAGNOSIS — Z79.899: ICD-10-CM

## 2022-03-08 DIAGNOSIS — F41.9: ICD-10-CM

## 2022-09-25 ENCOUNTER — HOSPITAL ENCOUNTER (OUTPATIENT)
Dept: HOSPITAL 53 - M LAB REF | Age: 32
End: 2022-09-25
Attending: PHYSICIAN ASSISTANT
Payer: COMMERCIAL

## 2022-09-25 DIAGNOSIS — N39.0: Primary | ICD-10-CM

## 2022-09-25 LAB
APPEARANCE UR: CLEAR
BACTERIA URNS QL MICRO: (no result)
BILIRUB UR QL STRIP: (no result)
COLOR UR: (no result)
GLUCOSE UR STRIP-MCNC: (no result) MG/DL
HGB UR QL STRIP: (no result)
HYALINE CASTS URNS QL MICRO: (no result) /LPF (ref 0–1)
KETONES UR QL STRIP: (no result) MG/DL
LEUKOCYTE ESTERASE UR QL STRIP: (no result)
NITRITE UR QL STRIP: (no result)
PH UR STRIP: (no result) UNITS (ref 5–7)
PROT UR STRIP-MCNC: (no result) MG/DL
RBC #/AREA URNS HPF: (no result) /HPF (ref 0–3)
SP GR UR STRIP: 1.02 (ref 1–1.03)
SQUAMOUS URNS QL MICRO: (no result) /HPF
UROBILINOGEN UR QL STRIP: (no result) MG/DL
WBC #/AREA URNS HPF: (no result) /HPF (ref 0–3)

## 2022-11-26 ENCOUNTER — HOSPITAL ENCOUNTER (EMERGENCY)
Dept: HOSPITAL 53 - M ED | Age: 32
Discharge: HOME | End: 2022-11-26
Payer: COMMERCIAL

## 2022-11-26 VITALS — SYSTOLIC BLOOD PRESSURE: 113 MMHG | DIASTOLIC BLOOD PRESSURE: 68 MMHG

## 2022-11-26 VITALS — HEIGHT: 65 IN | BODY MASS INDEX: 30.27 KG/M2 | WEIGHT: 181.66 LBS

## 2022-11-26 DIAGNOSIS — Z98.51: ICD-10-CM

## 2022-11-26 DIAGNOSIS — F32.A: ICD-10-CM

## 2022-11-26 DIAGNOSIS — R10.2: Primary | ICD-10-CM

## 2022-11-26 DIAGNOSIS — N89.8: ICD-10-CM

## 2022-11-26 DIAGNOSIS — Z79.899: ICD-10-CM

## 2022-11-26 DIAGNOSIS — Z79.890: ICD-10-CM

## 2022-11-26 DIAGNOSIS — E03.9: ICD-10-CM

## 2022-11-26 LAB
B-HCG SERPL QL: NEGATIVE
BASOPHILS # BLD AUTO: 0.1 10^3/UL (ref 0–0.2)
BASOPHILS NFR BLD AUTO: 0.6 % (ref 0–1)
BUN SERPL-MCNC: 14 MG/DL (ref 9–23)
CALCIUM SERPL-MCNC: 9.4 MG/DL (ref 8.5–10.1)
CHLORIDE SERPL-SCNC: 101 MMOL/L (ref 98–107)
CO2 SERPL-SCNC: 27 MMOL/L (ref 20–31)
CREAT SERPL-MCNC: 0.82 MG/DL (ref 0.55–1.3)
EOSINOPHIL # BLD AUTO: 0.3 10^3/UL (ref 0–0.5)
EOSINOPHIL NFR BLD AUTO: 4 % (ref 0–3)
GFR SERPL CREATININE-BSD FRML MDRD: > 60 ML/MIN/{1.73_M2} (ref 60–?)
GLUCOSE SERPL-MCNC: 77 MG/DL (ref 60–100)
HCT VFR BLD AUTO: 39.7 % (ref 36–47)
HGB BLD-MCNC: 12.9 G/DL (ref 12–15.5)
LYMPHOCYTES # BLD AUTO: 1.5 10^3/UL (ref 1.5–5)
LYMPHOCYTES NFR BLD AUTO: 18.3 % (ref 24–44)
MCH RBC QN AUTO: 29.2 PG (ref 27–33)
MCHC RBC AUTO-ENTMCNC: 32.5 G/DL (ref 32–36.5)
MCV RBC AUTO: 89.8 FL (ref 80–96)
MONOCYTES # BLD AUTO: 0.5 10^3/UL (ref 0–0.8)
MONOCYTES NFR BLD AUTO: 6.5 % (ref 2–8)
N GONORRHOEA RRNA SPEC QL NAA+PROBE: NEGATIVE
NEUTROPHILS # BLD AUTO: 5.8 10^3/UL (ref 1.5–8.5)
NEUTROPHILS NFR BLD AUTO: 69.9 % (ref 36–66)
PLATELET # BLD AUTO: 309 10^3/UL (ref 150–450)
POTASSIUM SERPL-SCNC: 4.4 MMOL/L (ref 3.5–5.1)
RBC # BLD AUTO: 4.42 10^6/UL (ref 4–5.4)
SODIUM SERPL-SCNC: 138 MMOL/L (ref 136–145)
WBC # BLD AUTO: 8.3 10^3/UL (ref 4–10)

## 2022-12-14 ENCOUNTER — HOSPITAL ENCOUNTER (OUTPATIENT)
Dept: HOSPITAL 53 - M SFHCADAM | Age: 32
End: 2022-12-14
Attending: FAMILY MEDICINE
Payer: COMMERCIAL

## 2022-12-14 DIAGNOSIS — E03.9: Primary | ICD-10-CM

## 2022-12-14 LAB
T4 FREE SERPL-MCNC: 1.27 NG/DL (ref 0.89–1.76)
TSH SERPL DL<=0.005 MIU/L-ACNC: 3.57 UIU/ML (ref 0.55–4.78)

## 2023-07-26 ENCOUNTER — HOSPITAL ENCOUNTER (OUTPATIENT)
Dept: HOSPITAL 53 - M SFHCADAM | Age: 33
End: 2023-07-26
Attending: FAMILY MEDICINE
Payer: COMMERCIAL

## 2023-07-26 DIAGNOSIS — Z00.00: Primary | ICD-10-CM

## 2023-07-26 LAB
ALBUMIN SERPL BCG-MCNC: 4.2 G/DL (ref 3.2–5.2)
ALP SERPL-CCNC: 83 U/L (ref 46–116)
ALT SERPL W P-5'-P-CCNC: 17 U/L (ref 7–40)
AST SERPL-CCNC: 9 U/L (ref ?–34)
BASOPHILS # BLD AUTO: 0.1 10^3/UL (ref 0–0.2)
BASOPHILS NFR BLD AUTO: 0.9 % (ref 0–1)
BILIRUB SERPL-MCNC: 0.5 MG/DL (ref 0.3–1.2)
BUN SERPL-MCNC: 19 MG/DL (ref 9–23)
CALCIUM SERPL-MCNC: 9.7 MG/DL (ref 8.5–10.1)
CHLORIDE SERPL-SCNC: 102 MMOL/L (ref 98–107)
CHOLEST SERPL-MCNC: 183 MG/DL (ref ?–200)
CHOLEST/HDLC SERPL: 3.42 {RATIO} (ref ?–5)
CO2 SERPL-SCNC: 27 MMOL/L (ref 20–31)
CREAT SERPL-MCNC: 0.86 MG/DL (ref 0.55–1.3)
EOSINOPHIL # BLD AUTO: 0.4 10^3/UL (ref 0–0.5)
EOSINOPHIL NFR BLD AUTO: 5.9 % (ref 0–3)
GFR SERPL CREATININE-BSD FRML MDRD: > 60 ML/MIN/{1.73_M2} (ref 60–?)
GLUCOSE SERPL-MCNC: 88 MG/DL (ref 60–100)
HCT VFR BLD AUTO: 41.8 % (ref 36–47)
HDLC SERPL-MCNC: 53.4 MG/DL (ref 40–?)
HGB BLD-MCNC: 13.4 G/DL (ref 12–15.5)
LDLC SERPL CALC-MCNC: 113.4 MG/DL (ref ?–100)
LYMPHOCYTES # BLD AUTO: 2 10^3/UL (ref 1.5–5)
LYMPHOCYTES NFR BLD AUTO: 29.5 % (ref 24–44)
MCH RBC QN AUTO: 29.2 PG (ref 27–33)
MCHC RBC AUTO-ENTMCNC: 32.1 G/DL (ref 32–36.5)
MCV RBC AUTO: 91.1 FL (ref 80–96)
MONOCYTES # BLD AUTO: 0.4 10^3/UL (ref 0–0.8)
MONOCYTES NFR BLD AUTO: 5.6 % (ref 2–8)
NEUTROPHILS # BLD AUTO: 3.8 10^3/UL (ref 1.5–8.5)
NEUTROPHILS NFR BLD AUTO: 57.8 % (ref 36–66)
NONHDLC SERPL-MCNC: 129.6 MG/DL
PLATELET # BLD AUTO: 323 10^3/UL (ref 150–450)
POTASSIUM SERPL-SCNC: 4.8 MMOL/L (ref 3.5–5.1)
PROT SERPL-MCNC: 7.2 G/DL (ref 5.7–8.2)
RBC # BLD AUTO: 4.59 10^6/UL (ref 4–5.4)
SODIUM SERPL-SCNC: 139 MMOL/L (ref 136–145)
T4 FREE SERPL-MCNC: 1.01 NG/DL (ref 0.89–1.76)
TRIGL SERPL-MCNC: 81 MG/DL (ref ?–150)
TSH SERPL DL<=0.005 MIU/L-ACNC: 3.57 UIU/ML (ref 0.55–4.78)
WBC # BLD AUTO: 6.6 10^3/UL (ref 4–10)

## 2024-03-13 ENCOUNTER — HOSPITAL ENCOUNTER (OUTPATIENT)
Dept: HOSPITAL 53 - M SFHCADAM | Age: 34
End: 2024-03-13
Attending: FAMILY MEDICINE
Payer: COMMERCIAL

## 2024-03-13 DIAGNOSIS — E03.9: Primary | ICD-10-CM

## 2024-05-01 ENCOUNTER — HOSPITAL ENCOUNTER (OUTPATIENT)
Dept: HOSPITAL 53 - M SFHCWAGY | Age: 34
End: 2024-05-01
Attending: FAMILY MEDICINE
Payer: COMMERCIAL

## 2024-05-01 DIAGNOSIS — Z12.4: Primary | ICD-10-CM

## 2024-09-17 ENCOUNTER — HOSPITAL ENCOUNTER (OUTPATIENT)
Dept: HOSPITAL 53 - M LAB REF | Age: 34
End: 2024-09-17
Attending: PHYSICIAN ASSISTANT
Payer: COMMERCIAL

## 2024-09-17 DIAGNOSIS — N39.0: Primary | ICD-10-CM

## 2024-09-17 LAB
APPEARANCE UR: CLEAR
BACTERIA UR QL AUTO: NEGATIVE
BILIRUB UR QL STRIP.AUTO: NEGATIVE
GLUCOSE UR QL STRIP.AUTO: NEGATIVE MG/DL
HGB UR QL STRIP.AUTO: NEGATIVE
KETONES UR QL STRIP.AUTO: NEGATIVE MG/DL
LEUKOCYTE ESTERASE UR QL STRIP.AUTO: NEGATIVE
NITRITE UR QL STRIP.AUTO: NEGATIVE
PH UR STRIP.AUTO: 8 UNITS (ref 5–9)
PROT UR QL STRIP.AUTO: NEGATIVE MG/DL
RBC # UR AUTO: 0 /HPF (ref 0–3)
SP GR UR STRIP.AUTO: 1.01 (ref 1–1.03)
SQUAMOUS #/AREA URNS AUTO: 1 /HPF (ref 0–6)
UROBILINOGEN UR QL STRIP.AUTO: 0.2 MG/DL (ref 0–2)
WBC #/AREA URNS AUTO: 0 /HPF (ref 0–3)

## 2024-10-08 ENCOUNTER — HOSPITAL ENCOUNTER (OUTPATIENT)
Dept: HOSPITAL 53 - M LAB REF | Age: 34
End: 2024-10-08
Attending: PHYSICIAN ASSISTANT
Payer: COMMERCIAL

## 2024-10-08 DIAGNOSIS — N39.0: Primary | ICD-10-CM

## 2024-10-08 LAB
APPEARANCE UR: CLEAR
BACTERIA UR QL AUTO: NEGATIVE
BILIRUB UR QL STRIP.AUTO: NEGATIVE
GLUCOSE UR QL STRIP.AUTO: NEGATIVE MG/DL
HGB UR QL STRIP.AUTO: NEGATIVE
KETONES UR QL STRIP.AUTO: NEGATIVE MG/DL
LEUKOCYTE ESTERASE UR QL STRIP.AUTO: NEGATIVE
N GONORRHOEA RRNA SPEC QL NAA+PROBE: NEGATIVE
NITRITE UR QL STRIP.AUTO: NEGATIVE
PH UR STRIP.AUTO: 6 UNITS (ref 5–9)
PROT UR QL STRIP.AUTO: NEGATIVE MG/DL
RBC # UR AUTO: 1 /HPF (ref 0–3)
SP GR UR STRIP.AUTO: 1.02 (ref 1–1.03)
SQUAMOUS #/AREA URNS AUTO: 1 /HPF (ref 0–6)
T VAGINALIS RRNA SPEC QL NAA+PROBE: NOT DETECTED
UROBILINOGEN UR QL STRIP.AUTO: 0.2 MG/DL (ref 0–2)
WBC #/AREA URNS AUTO: 0 /HPF (ref 0–3)